# Patient Record
Sex: MALE | Race: WHITE
[De-identification: names, ages, dates, MRNs, and addresses within clinical notes are randomized per-mention and may not be internally consistent; named-entity substitution may affect disease eponyms.]

---

## 2017-10-22 NOTE — EDM.PDOC
ED HPI GENERAL MEDICAL PROBLEM





- General


Chief Complaint: Abdominal Pain


Stated Complaint: PT HAS STOMACH PAINS


Time Seen by Provider: 10/22/17 19:07


Source of Information: Reports: Patient


History Limitations: Reports: No Limitations





- History of Present Illness


INITIAL COMMENTS - FREE TEXT/NARRATIVE: 


History of present illness:


[84-year-old male presents with complaints of upper abdominal pain. Indicates 

that it started sometime this morning and has gotten progressively worse. 

Patient indicates that he is concerned that it might be something seriously 

would like to be evaluated.]





Review of systems: 


As per history of present illness and below otherwise all systems reviewed and 

negative.





Past medical history: 


As per history of present illness and as reviewed below otherwise 

noncontributory.





Surgical history: 


As per history of present illness and as reviewed below otherwise 

noncontributory.





Social history: 


No reported history of drug or alcohol abuse.





Family history: 


As per history of present illness and as reviewed below otherwise 

noncontributory.





Physical exam:


HEENT: Atraumatic, normocephalic, pupils reactive, negative for conjunctival 

pallor or scleral icterus, mucous membranes moist, throat clear, neck supple, 

nontender, trachea midline.


Lungs: Clear to auscultation, breath sounds equal bilaterally, chest nontender.


Heart: S1S2, regular, negative for clicks, rubs, or JVD.


Abdomen: Soft, nondistended, nontender. Negative for masses or 

hepatosplenomegaly. Negative for costovertebral tenderness.


Pelvis: Stable nontender.


Genitourinary: Deferred.


Rectal: Deferred.


Extremities: Atraumatic, negative for cords or calf pain. Neurovascular 

unremarkable.


Neuro: Awake, alert, oriented. Cranial nerves II through XII unremarkable. 

Cerebellum unremarkable. Motor and sensory unremarkable throughout. Exam 

nonfocal.





Diagnostics:


[CBC, CMP, amylase, lipase, troponin, EKG]





Therapeutics:


[Saline lock]





Impression: 


[#1 abdominal pain


#2 renal calculi]





Plan:


[Flomax, pain medicine]





Definitive disposition and diagnosis as appropriate pending reevaluation and 

review of above.








  ** abdominal area


Pain Score (Numeric/FACES): 5





- Related Data


 Allergies











Allergy/AdvReac Type Severity Reaction Status Date / Time


 


No Known Allergies Allergy   Verified 10/22/17 18:45











Home Meds: 


 Home Meds





Allopurinol [Zyloprim] 100 mg PO ONETIME 10/22/17 [History]


Furosemide 40 mg PO DAILY 10/22/17 [History]


Tamsulosin [Flomax] 0.4 mg PO BIDPC #20 cap.er 10/22/17 [Rx]


Terazosin [Hytrin] 1 mg PO DAILY 10/22/17 [History]


atorvaSTATin [Lipitor] 20 mg PO ONETIME 10/22/17 [History]


metFORMIN [Glucophage XR] 500 mg PO BIDMEALS 10/22/17 [History]











Past Medical History


HEENT History: Reports: Hard of Hearing, Other (See Below)


Other HEENT History: bilateral hearing aid.  false teeth


Cardiovascular History: Reports: High Cholesterol


Respiratory History: Reports: None


Gastrointestinal History: Reports: Other (See Below)


Other Gastrointestinal History: Abdominal Hernia


Genitourinary History: Reports: None


Musculoskeletal History: Reports: None


Neurological History: Reports: None


Psychiatric History: Reports: None


Endocrine/Metabolic History: Reports: Diabetes, Type II


Hematologic History: Reports: None


Immunologic History: Reports: None


Oncologic (Cancer) History: Reports: None


Dermatologic History: Reports: None





- Infectious Disease History


Infectious Disease History: Reports: None





- Past Surgical History


Head Surgeries/Procedures: Reports: None





Social & Family History





- Family History


Family Medical History: Noncontributory





- Tobacco Use


Smoking Status *Q: Never Smoker





- Caffeine Use


Caffeine Use: Reports: Soda





- Recreational Drug Use


Recreational Drug Use: No





ED ROS GENERAL





- Review of Systems


Review Of Systems: See Below (History of present illness)





ED EXAM, GENERAL





- Physical Exam


Exam: See Below (See history of present illness)





Course





- Vital Signs


Last Recorded V/S: 


 Last Vital Signs











Temp  36.6 C   10/22/17 18:45


 


Pulse  109 H  10/22/17 20:15


 


Resp  20   10/22/17 20:15


 


BP  128/70   10/22/17 20:15


 


Pulse Ox  95   10/22/17 20:15














- Orders/Labs/Meds


Orders: 


 Active Orders 24 hr











 Category Date Time Status


 


 EKG Documentation Completion [RC] STAT Care  10/22/17 18:55 Active


 


 Abdomen Pelvis wo Cont [CT] Stat Exams  10/22/17 20:44 Taken


 


 Chest 2V [CR] Stat Exams  10/22/17 18:55 Taken


 


 Sodium Chloride 0.9% [Saline Flush] Med  10/22/17 18:55 Active





 10 ml FLUSH ASDIRECTED PRN   


 


 Sodium Chloride 0.9% [Saline Flush] Med  10/22/17 18:55 Active





 2.5 ml FLUSH ASDIRECTED PRN   


 


 Saline Lock Insert [OM.PC] Stat Oth  10/22/17 18:55 Ordered








 Medication Orders





Sodium Chloride (Saline Flush)  10 ml FLUSH ASDIRECTED PRN


   PRN Reason: Keep Vein Open


   Last Admin: 10/22/17 19:17  Dose: 10 ml


Sodium Chloride (Saline Flush)  2.5 ml FLUSH ASDIRECTED PRN


   PRN Reason: Keep Vein Open


   Last Admin: 10/22/17 19:17  Dose: 2.5 ml








Labs: 


 Laboratory Tests











  10/22/17 10/22/17 Range/Units





  19:15 19:15 


 


WBC  11.60 H   (4.0-11.0)  K/uL


 


RBC  4.79   (4.50-5.90)  M/uL


 


Hgb  15.5   (13.0-17.0)  g/dL


 


Hct  46.3   (38.0-50.0)  %


 


MCV  96.7   (80.0-98.0)  fL


 


MCH  32.4 H   (27.0-32.0)  pg


 


MCHC  33.5   (31.0-37.0)  g/dL


 


RDW Std Deviation  51.4   (28.0-62.0)  fl


 


RDW Coeff of Noah  15   (11.0-15.0)  %


 


Plt Count  189   (150-400)  K/uL


 


MPV  10.40   (7.40-12.00)  fL


 


Neut % (Auto)  81.7 H   (48.0-80.0)  %


 


Lymph % (Auto)  9.6 L   (16.0-40.0)  %


 


Mono % (Auto)  8.5   (0.0-15.0)  %


 


Eos % (Auto)  0.1   (0.0-7.0)  %


 


Baso % (Auto)  0.1   (0.0-1.5)  %


 


Neut # (Auto)  9.5 H   (1.4-5.7)  K/uL


 


Lymph # (Auto)  1.1   (0.6-2.4)  K/uL


 


Mono # (Auto)  1.0 H   (0.0-0.8)  K/uL


 


Eos # (Auto)  0.0   (0.0-0.7)  K/uL


 


Baso # (Auto)  0.0   (0.0-0.1)  K/uL


 


Nucleated RBC %  0.0   /100WBC


 


Nucleated RBCs #  0   K/uL


 


Sodium   141  (136-146)  mmol/L


 


Potassium   4.4  (3.5-5.1)  mmol/L


 


Chloride   105  ()  mmol/L


 


Carbon Dioxide   25  (21-31)  mmol/L


 


BUN   14  (6.0-23.0)  mg/dL


 


Creatinine   1.7 H  (0.6-1.5)  mg/dL


 


Est Cr Clr Drug Dosing   35.50  mL/min


 


Estimated GFR (MDRD)   38.6  ml/min


 


Glucose   138 H  ()  mg/dL


 


Calcium   9.6  (8.8-10.8)  mg/dL


 


Total Bilirubin   1.2  (0.1-1.5)  mg/dL


 


AST   21  (5-40)  IU/L


 


ALT   18  (8-54)  IU/L


 


Alkaline Phosphatase   125  ()  


 


Troponin I   < 0.10  (0.0-0.29)  NG/ML


 


Total Protein   7.0  (6.0-8.0)  g/dL


 


Albumin   4.0  (3.4-4.8)  g/dL


 


Globulin   3.0  (2.0-3.5)  g/dL


 


Albumin/Globulin Ratio   1.3  (1.3-2.8)  


 


Amylase   37  (10-90)  U/L


 


Lipase   < 9  (7-80)  U/L











Meds: 


Medications











Generic Name Dose Route Start Last Admin





  Trade Name Heidi  PRN Reason Stop Dose Admin


 


Sodium Chloride  10 ml  10/22/17 18:55  10/22/17 19:17





  Saline Flush  FLUSH   10 ml





  ASDIRECTED PRN   Administration





  Keep Vein Open   


 


Sodium Chloride  2.5 ml  10/22/17 18:55  10/22/17 19:17





  Saline Flush  FLUSH   2.5 ml





  ASDIRECTED PRN   Administration





  Keep Vein Open   














Discontinued Medications














Generic Name Dose Route Start Last Admin





  Trade Name Freq  PRN Reason Stop Dose Admin


 


Ketorolac Tromethamine  30 mg  10/22/17 18:55  10/22/17 19:17





  Toradol  IVPUSH  10/22/17 18:56  30 mg





  ONETIME ONE   Administration














Departure





- Departure


Time of Disposition: 22:18


Disposition: Home, Self-Care 01


Condition: Good


Clinical Impression: 


 Renal and ureteric calculus








- Discharge Information


Referrals: 


PCP,None [Primary Care Provider] - 


Forms:  ED Department Discharge


Additional Instructions: 


The following information is given to patients seen in the emergency department 

who are being discharged to home. This information is to outline your options 

for follow-up care. We provide all patients seen in our emergency department 

with a follow-up referral.





The need for follow-up, as well as the timing and circumstances, are variable 

depending upon the specifics of your emergency department visit.





If you don't have a primary care physician on staff, we will provide you with a 

referral. We always advise you to contact your personal physician following an 

emergency department visit to inform them of the circumstance of the visit and 

for follow-up with them and/or the need for any referrals to a consulting 

specialist.





The emergency department will also refer you to a specialist when appropriate. 

This referral assures that you have the opportunity for follow-up care with a 

specialist. All of these measure are taken in an effort to provide you with 

optimal care, which includes your follow-up.





Under all circumstances we always encourage you to contact your private 

physician who remains a resource for coordinating your care. When calling for 

follow-up care, please make the office aware that this follow-up is from your 

recent emergency room visit. If for any reason you are refused follow-up, 

please contact the [facility] Emergency 

Department at [phone] and asked to speak to the emergency department 

charge nurse.





Take medication as directed





Follow-up with primary care provider one to 2 days








Follow up with urology





[facility]


Specialty Care - Urology


[address] [place] [id number]


Phone: [phone]


Fax: [phone]











- My Orders


Last 24 Hours: 


My Active Orders





10/22/17 18:55


EKG Documentation Completion [RC] STAT 


Chest 2V [CR] Stat 


Sodium Chloride 0.9% [Saline Flush]   10 ml FLUSH ASDIRECTED PRN 


Sodium Chloride 0.9% [Saline Flush]   2.5 ml FLUSH ASDIRECTED PRN 


Saline Lock Insert [OM.PC] Stat 





10/22/17 20:44


Abdomen Pelvis wo Cont [CT] Stat 














- Assessment/Plan


Last 24 Hours: 


My Active Orders





10/22/17 18:55


EKG Documentation Completion [RC] STAT 


Chest 2V [CR] Stat 


Sodium Chloride 0.9% [Saline Flush]   10 ml FLUSH ASDIRECTED PRN 


Sodium Chloride 0.9% [Saline Flush]   2.5 ml FLUSH ASDIRECTED PRN 


Saline Lock Insert [OM.PC] Stat 





10/22/17 20:44


Abdomen Pelvis wo Cont [CT] Stat

## 2017-10-23 NOTE — CR
EXAM DATE: 10/22/17



PATIENT'S AGE: 84



Patient: MAGAN DELCID



Facility: [place], [place]

Patient ID: [id number]

Site Patient ID: [id number].

Site Accession #: [id number]

: 1933

Study: XRay Chest [id number]/22/2017 7:44:41 PM

Ordering Physician: Doctor Temp



Final Report: 

INDICATION: Abdominal pain



TECHNIQUE: Chest radiograph 2 views 



COMPARISON: None 



FINDINGS: 

Moderate degradation of image quality noted due to body habitus. 



Cardiovascular and mediastinum: The cardiac silhouette is normal in appearance 
and size. Mediastinum is within normal limits. 



Lungs and pleural spaces: Both lungs are unremarkable in appearance. No sign of 
pleural effusion. No pneumothorax is seen. 



Bones and soft tissues: No significant findings. 



IMPRESSION: 

1. No acute cardiopulmonary disease seen. 





Dictated by: Leonel Nunez MD @ 10/22/2017 20:14:22

(Electronic Signature)





Report Signed by Proxy.
TOÑA

## 2017-10-23 NOTE — CT
EXAM DATE: 10/22/17



PATIENT'S AGE: 84



Patient: MAGAN DELCID



Facility: [place], [place]

Patient ID: [id number]

Site Patient ID: [id number].

Site Accession #: [id number]

: 1933

Study: CT Abdomen/Pelvis [id number]/22/2017 9:15:42 PM

Ordering Physician: Doctor Temp



Final Report: 

INDICATION: Abdominal pain in the left lower quadrant



TECHNIQUE: CT Abdomen and pelvis without i.v. contrast. Coronal and sagittal 
reformats were obtained.



CONTRAST: None



COMPARISON: 2009



FINDINGS: 



Lower chest: Unremarkable. 



Liver: Unremarkable. 



Spleen: Unremarkable. 



Pancreas: Unremarkable. 



Gallbladder: The gallbladder is moderately distended measuring 5 cm without 
evidence of wall thickening or surrounding inflammatory changes. 



Kidney: There is a 1 mm stone present in the distal left ureter that causes 
moderate left hydroureter and moderate left renal pelvicaliectasis. There is a 
2 mm stone present near the upper pole of the left kidney. Mild left 
perinephric edema is noted. A 1 mm stone is present in the mid zone of the 
right kidney. 



Adrenal: Unremarkable. 



GI tract: Moderate amount of stool is present throughout the colon which may be 
due to chronic constipation. The appendix measures 1 cm in length which may be 
due to prior appendectomy with residual appendiceal stump. A moderate-to-large 
fat containing umbilical hernia is noted measuring 6 cm. 



Vascular: Unremarkable. 



Lymph: Unremarkable. 



Peritoneum: Unremarkable. No pneumoperitoneum is seen. No significant ascites 
is noted. 



Pelvis: There are over 10 punctate stones present within the bladder measuring 
up to 5 mm. Mild enlargement of the prostate gland is noted. 



Soft tissue: Unremarkable. 



Bones: There is a stable lucent lesion in the L3 vertebral body noted measuring 
1.6 cm. 



IMPRESSIONS: 

1. There is a 1 mm stone present in the distal left ureter that causes moderate 
left hydroureter and moderate left renal pelvicaliectasis.

2. There are over 10 punctate stones present within the bladder measuring up to 
5 mm.



Dictated by Leonel Nunez MD @ 10/22/2017 10:08:47 PM



Dictated by: Leonel Nunez MD @ 10/22/2017 22:08:55

(Electronic Signature)



Report Signed by Proxy.
TOÑA

## 2021-12-24 ENCOUNTER — HOSPITAL ENCOUNTER (INPATIENT)
Dept: HOSPITAL 56 - MW.ED | Age: 86
LOS: 7 days | Discharge: HOME HEALTH | DRG: 56 | End: 2021-12-31
Attending: FAMILY MEDICINE | Admitting: FAMILY MEDICINE
Payer: MEDICARE

## 2021-12-24 DIAGNOSIS — R29.700: ICD-10-CM

## 2021-12-24 DIAGNOSIS — U09.9: ICD-10-CM

## 2021-12-24 DIAGNOSIS — I44.4: ICD-10-CM

## 2021-12-24 DIAGNOSIS — Z79.899: ICD-10-CM

## 2021-12-24 DIAGNOSIS — R26.2: ICD-10-CM

## 2021-12-24 DIAGNOSIS — E11.9: ICD-10-CM

## 2021-12-24 DIAGNOSIS — E78.00: ICD-10-CM

## 2021-12-24 DIAGNOSIS — I69.993: Primary | ICD-10-CM

## 2021-12-24 DIAGNOSIS — H91.93: ICD-10-CM

## 2021-12-24 DIAGNOSIS — I10: ICD-10-CM

## 2021-12-24 DIAGNOSIS — Z79.84: ICD-10-CM

## 2021-12-24 DIAGNOSIS — H91.90: ICD-10-CM

## 2021-12-24 DIAGNOSIS — U07.1: ICD-10-CM

## 2021-12-24 DIAGNOSIS — Z87.891: ICD-10-CM

## 2021-12-24 DIAGNOSIS — E78.5: ICD-10-CM

## 2021-12-24 LAB
BUN SERPL-MCNC: 14 MG/DL (ref 7–18)
CHLORIDE SERPL-SCNC: 102 MMOL/L (ref 98–107)
CO2 SERPL-SCNC: 26.5 MMOL/L (ref 21–32)
FLUAV RNA UPPER RESP QL NAA+PROBE: NEGATIVE
FLUBV RNA UPPER RESP QL NAA+PROBE: NEGATIVE
GLUCOSE SERPL-MCNC: 145 MG/DL (ref 74–106)
POTASSIUM SERPL-SCNC: 3.9 MMOL/L (ref 3.5–5.1)
SARS-COV-2 RNA RESP QL NAA+PROBE: POSITIVE
SODIUM SERPL-SCNC: 142 MMOL/L (ref 136–148)

## 2021-12-24 PROCEDURE — A9577 INJ MULTIHANCE: HCPCS

## 2021-12-24 NOTE — CT
INDICATION:



Acute stroke, ataxia.



TECHNIQUE:



CTA head with contrast bolus tracking and 3D MIP reconstruction.



FINDINGS:



There is minor intracranial atherosclerotic disease. There is no 

significant intracranial stenosis. There is no large vessel occlusion. No 

aneurysm is identified.



IMPRESSION:



Unremarkable head CTA.



Please note that all CT scans at this facility use dose modulation, 

iterative reconstruction, and/or weight-based dosing when appropriate to 

reduce radiation dose to as low as reasonably achievable.



Dictated by Fernando Zhu MD @ 12/24/2021 8:23:02 PM



(Electronically Signed)

## 2021-12-24 NOTE — PCM.HP.2
H&P History of Present Illness





- General


Date of Service: 12/24/21


Admit Problem/Dx: 


                           Admission Diagnosis/Problem





Admission Diagnosis/Problem      Ambulatory dysfunction








Source of Information: Patient


History Limitations: Reports: Other (Hard of hearing, but lucid)





- History of Present Illness


Initial Comments - Free Text/Narative: 





Patient had been in his usual state of health until about today when he noticed 

that he was unable to walk normally.  He was unable to climb into the passenger 

side of knee cab of a wrecker truck where he rides with a friend.  Knee is never

to his knowledge had a stroke or physical impairment of the sort.  He has 

diabetes, hypertension, but denies any history of cardiovascular, 

cerebrovascular, or pulmonary disease.  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  He denies significant past illnesses or surgeries.  In the 

emergency room he was observed to have a sort of circumduction or shuffling 

gait.  He had not fallen.  He seemed to have weakness in the right leg in Dr. Garcia's exam, but said that his left leg felt weaker my exam.  Arms are 

normal.  Fasces were normal.  Results of CTs and other studies showed no 

evidence of acute cerebral infarction or hemorrhage, or ventricular abnormality,

or mass-effect.  Review of systems otherwise negative.





- Related Data


Allergies/Adverse Reactions: 


                                    Allergies











Allergy/AdvReac Type Severity Reaction Status Date / Time


 


No Known Allergies Allergy   Verified 12/24/21 22:05











Home Medications: 


                                    Home Meds





Allopurinol [Zyloprim] 100 mg PO ONETIME 10/22/17 [History]


Furosemide 40 mg PO DAILY 10/22/17 [History]


atorvaSTATin [Lipitor] 20 mg PO BEDTIME 10/22/17 [History]


metFORMIN [Glucophage XR] 500 mg PO DAILY 10/22/17 [History]











Past Medical History


HEENT History: Reports: Hard of Hearing, Other (See Below)


Other HEENT History: bilateral hearing aids.  false teeth


Cardiovascular History: Reports: High Cholesterol


Respiratory History: Reports: None


Gastrointestinal History: Reports: Other (See Below)


Other Gastrointestinal History: Abdominal Hernia


Genitourinary History: Reports: None


Musculoskeletal History: Reports: None


Neurological History: Reports: None


Psychiatric History: Reports: None


Endocrine/Metabolic History: Reports: Diabetes, Type II


Hematologic History: Reports: None


Immunologic History: Reports: None


Oncologic (Cancer) History: Reports: None


Dermatologic History: Reports: None





- Infectious Disease History


Infectious Disease History: Reports: None





- Past Surgical History


Head Surgeries/Procedures: Reports: None





Social & Family History





- Family History


Family Medical History: No Pertinent Family History





- Tobacco Use


Tobacco Use Status *Q: Former Tobacco User


Used Tobacco, but Quit: Yes


Month/Year Tobacco Last Used: 20 years





- Caffeine Use


Caffeine Use: Reports: Soda





- Recreational Drug Use


Recreational Drug Use: No





- Living Situation & Occupation


Living situation: Reports: , Alone


Occupation: Retired





H&P Review of Systems





- Review of Systems:


Review Of Systems: See Below


General: Reports: No Symptoms


HEENT: Reports: Hearing Changes


Pulmonary: Reports: No Symptoms


Cardiovascular: Reports: No Symptoms


Gastrointestinal: Reports: No Symptoms


Genitourinary: Reports: No Symptoms


Musculoskeletal: Reports: No Symptoms


Skin: Reports: No Symptoms


Psychiatric: Reports: No Symptoms


Neurological: Reports: No Symptoms


Hematologic/Lymphatic: Reports: No Symptoms


Immunologic: Reports: No Symptoms





Exam





- Exam


Exam: See Below





- Vital Signs


Vital Signs: 


                                Last Vital Signs











Temp  98.1 F   12/24/21 23:49


 


Pulse  110 H  12/24/21 23:49


 


Resp  22 H  12/24/21 23:49


 


BP  100/58 L  12/24/21 23:49


 


Pulse Ox  97   12/24/21 23:49











Weight: 202 lb 8 oz





- Exam


Quality Assessment: Supplemental Oxygen


General: Alert, Oriented, Cooperative


HEENT: Conjunctiva Clear, EOMI


Neck: Supple, Trachea Midline


Lungs: Clear to Auscultation, Normal Respiratory Effort


Cardiovascular: Regular Rate.  No: Gallop/S3, Gallop/S4


GI/Abdominal Exam: Normal Bowel Sounds, Non-Tender


 (Male) Exam: Deferred


Rectal (Males) Exam: Deferred


Back Exam: Normal Inspection


Extremities: Normal Inspection, Non-Tender, No Pedal Edema


Skin: Other (Senile keratoses.)


Neurological: Cranial Nerves Intact, Sensation Intact


Neuro Extensive - Mental Status: Alert, Oriented x3, Normal Mood/Affect, Normal 

Cognition.  No: Memory Loss-Recent Events


Neuro Extensive - Motor, Sensory, Reflexes: Abnormal Gait (Can stand next to 

wheelchair and transfer himself from bed to wheelchair. Did not further test 

gait due to leg weakness and avoidance of falling.), Ataxia, Abnormal Finger to 

Nose.  No: Normal Gait, Tongue Deviation (L), Dysarthria, Receptive Aphasia, 

Expressive Aphasia, Facial palsy (L), Facial Palsy (R), Pronator Drift (L), 

Abnormal Romberg, Tremor


Psychiatric: Alert, Normal Affect, Normal Mood





- Patient Data


Lab Results Last 24 hrs: 


                         Laboratory Results - last 24 hr











  12/24/21 12/24/21 12/24/21 Range/Units





  18:30 18:30 18:30 


 


WBC  7.32    (4.0-11.0)  K/uL


 


RBC  4.68    (4.50-5.90)  M/uL


 


Hgb  15.3    (13.0-17.0)  g/dL


 


Hct  45.9    (38.0-50.0)  %


 


MCV  98.1 H    (80.0-98.0)  fL


 


MCH  32.7 H    (27.0-32.0)  pg


 


MCHC  33.3    (31.0-37.0)  g/dL


 


RDW Std Deviation  49.8    (28.0-62.0)  fl


 


RDW Coeff of Noah  14    (11.0-15.0)  %


 


Plt Count  195    (150-400)  K/uL


 


MPV  11.00    (7.40-12.00)  fL


 


Neut % (Auto)  77.8    (48.0-80.0)  %


 


Lymph % (Auto)  8.1 L    (16.0-40.0)  %


 


Mono % (Auto)  13.3    (0.0-15.0)  %


 


Eos % (Auto)  0.7    (0.0-7.0)  %


 


Baso % (Auto)  0.1    (0.0-1.5)  %


 


Neut # (Auto)  5.7    (1.4-5.7)  K/uL


 


Lymph # (Auto)  0.6    (0.6-2.4)  K/uL


 


Mono # (Auto)  1.0 H    (0.0-0.8)  K/uL


 


Eos # (Auto)  0.1    (0.0-0.7)  K/uL


 


Baso # (Auto)  0.0    (0.0-0.1)  K/uL


 


Nucleated RBC %  0.0    /100WBC


 


Nucleated RBCs #  0    K/uL


 


ESR     (0-19)  mm/hr


 


INR   1.00   


 


APTT   24.5   (18.6-31.3)  SEC


 


Sodium    142  (136-148)  mmol/L


 


Potassium    3.9  (3.5-5.1)  mmol/L


 


Chloride    102  ()  mmol/L


 


Carbon Dioxide    26.5  (21.0-32.0)  mmol/L


 


BUN    14  (7.0-18.0)  mg/dL


 


Creatinine    1.7 H  (0.8-1.3)  mg/dL


 


Est Cr Clr Drug Dosing    32.97  mL/min


 


Estimated GFR (MDRD)    38.2  ml/min


 


Glucose    145 H  ()  mg/dL


 


POC Glucose     (70-99)  mg/dL


 


Calcium    8.5  (8.5-10.1)  mg/dL


 


Total Bilirubin    1.0  (0.2-1.0)  mg/dL


 


AST    23  (15-37)  IU/L


 


ALT    31  (14-63)  IU/L


 


Alkaline Phosphatase    145 H  ()  U/L


 


Creatine Kinase     ()  U/L


 


Troponin I    < 0.050  (0.000-0.056)  ng/mL


 


C-Reactive Protein     (0.00-0.90)  mg/dL


 


Total Protein    7.1  (6.4-8.2)  g/dL


 


Albumin    3.7  (3.4-5.0)  g/dL


 


Globulin    3.4  (2.6-4.0)  g/dL


 


Albumin/Globulin Ratio    1.1  (0.9-1.6)  


 


Influenza Type A RNA     (NEGATIVE)  


 


Influenza Type B RNA     (NEGATIVE)  


 


SARS-CoV-2 RNA ([facility])     (NEGATIVE)  














  12/24/21 12/24/21 12/24/21 Range/Units





  18:30 18:30 18:30 


 


WBC     (4.0-11.0)  K/uL


 


RBC     (4.50-5.90)  M/uL


 


Hgb     (13.0-17.0)  g/dL


 


Hct     (38.0-50.0)  %


 


MCV     (80.0-98.0)  fL


 


MCH     (27.0-32.0)  pg


 


MCHC     (31.0-37.0)  g/dL


 


RDW Std Deviation     (28.0-62.0)  fl


 


RDW Coeff of Noah     (11.0-15.0)  %


 


Plt Count     (150-400)  K/uL


 


MPV     (7.40-12.00)  fL


 


Neut % (Auto)     (48.0-80.0)  %


 


Lymph % (Auto)     (16.0-40.0)  %


 


Mono % (Auto)     (0.0-15.0)  %


 


Eos % (Auto)     (0.0-7.0)  %


 


Baso % (Auto)     (0.0-1.5)  %


 


Neut # (Auto)     (1.4-5.7)  K/uL


 


Lymph # (Auto)     (0.6-2.4)  K/uL


 


Mono # (Auto)     (0.0-0.8)  K/uL


 


Eos # (Auto)     (0.0-0.7)  K/uL


 


Baso # (Auto)     (0.0-0.1)  K/uL


 


Nucleated RBC %     /100WBC


 


Nucleated RBCs #     K/uL


 


ESR    11  (0-19)  mm/hr


 


INR     


 


APTT     (18.6-31.3)  SEC


 


Sodium     (136-148)  mmol/L


 


Potassium     (3.5-5.1)  mmol/L


 


Chloride     ()  mmol/L


 


Carbon Dioxide     (21.0-32.0)  mmol/L


 


BUN     (7.0-18.0)  mg/dL


 


Creatinine     (0.8-1.3)  mg/dL


 


Est Cr Clr Drug Dosing     mL/min


 


Estimated GFR (MDRD)     ml/min


 


Glucose     ()  mg/dL


 


POC Glucose     (70-99)  mg/dL


 


Calcium     (8.5-10.1)  mg/dL


 


Total Bilirubin     (0.2-1.0)  mg/dL


 


AST     (15-37)  IU/L


 


ALT     (14-63)  IU/L


 


Alkaline Phosphatase     ()  U/L


 


Creatine Kinase   65   ()  U/L


 


Troponin I     (0.000-0.056)  ng/mL


 


C-Reactive Protein     (0.00-0.90)  mg/dL


 


Total Protein     (6.4-8.2)  g/dL


 


Albumin     (3.4-5.0)  g/dL


 


Globulin     (2.6-4.0)  g/dL


 


Albumin/Globulin Ratio     (0.9-1.6)  


 


Influenza Type A RNA  NEGATIVE    (NEGATIVE)  


 


Influenza Type B RNA  NEGATIVE    (NEGATIVE)  


 


SARS-CoV-2 RNA ([facility])  POSITIVE H    (NEGATIVE)  














  12/24/21 12/24/21 Range/Units





  18:30 18:57 


 


WBC    (4.0-11.0)  K/uL


 


RBC    (4.50-5.90)  M/uL


 


Hgb    (13.0-17.0)  g/dL


 


Hct    (38.0-50.0)  %


 


MCV    (80.0-98.0)  fL


 


MCH    (27.0-32.0)  pg


 


MCHC    (31.0-37.0)  g/dL


 


RDW Std Deviation    (28.0-62.0)  fl


 


RDW Coeff of Noah    (11.0-15.0)  %


 


Plt Count    (150-400)  K/uL


 


MPV    (7.40-12.00)  fL


 


Neut % (Auto)    (48.0-80.0)  %


 


Lymph % (Auto)    (16.0-40.0)  %


 


Mono % (Auto)    (0.0-15.0)  %


 


Eos % (Auto)    (0.0-7.0)  %


 


Baso % (Auto)    (0.0-1.5)  %


 


Neut # (Auto)    (1.4-5.7)  K/uL


 


Lymph # (Auto)    (0.6-2.4)  K/uL


 


Mono # (Auto)    (0.0-0.8)  K/uL


 


Eos # (Auto)    (0.0-0.7)  K/uL


 


Baso # (Auto)    (0.0-0.1)  K/uL


 


Nucleated RBC %    /100WBC


 


Nucleated RBCs #    K/uL


 


ESR    (0-19)  mm/hr


 


INR    


 


APTT    (18.6-31.3)  SEC


 


Sodium    (136-148)  mmol/L


 


Potassium    (3.5-5.1)  mmol/L


 


Chloride    ()  mmol/L


 


Carbon Dioxide    (21.0-32.0)  mmol/L


 


BUN    (7.0-18.0)  mg/dL


 


Creatinine    (0.8-1.3)  mg/dL


 


Est Cr Clr Drug Dosing    mL/min


 


Estimated GFR (MDRD)    ml/min


 


Glucose    ()  mg/dL


 


POC Glucose   138 H  (70-99)  mg/dL


 


Calcium    (8.5-10.1)  mg/dL


 


Total Bilirubin    (0.2-1.0)  mg/dL


 


AST    (15-37)  IU/L


 


ALT    (14-63)  IU/L


 


Alkaline Phosphatase    ()  U/L


 


Creatine Kinase    ()  U/L


 


Troponin I    (0.000-0.056)  ng/mL


 


C-Reactive Protein  0.40   (0.00-0.90)  mg/dL


 


Total Protein    (6.4-8.2)  g/dL


 


Albumin    (3.4-5.0)  g/dL


 


Globulin    (2.6-4.0)  g/dL


 


Albumin/Globulin Ratio    (0.9-1.6)  


 


Influenza Type A RNA    (NEGATIVE)  


 


Influenza Type B RNA    (NEGATIVE)  


 


SARS-CoV-2 RNA ([facility])    (NEGATIVE)  











Result Diagrams: 


                                 12/27/21 05:57





                                 12/27/21 05:57





Sepsis Event Note





- Evaluation


Sepsis Screening Result: No Definite Risk





- Focused Exam


Vital Signs: 


                                   Vital Signs











  Temp Pulse Resp BP Pulse Ox


 


 12/24/21 23:49  98.1 F  110 H  22 H  100/58 L  97


 


 12/24/21 22:04  98.1 F  111 H  20  121/65  94 L


 


 12/24/21 21:00  97.5 F  116 H  22 H  117/68  94 L


 


 12/24/21 20:39   87  20  123/76  99


 


 12/24/21 19:48  99.5 F  91  20  128/83  95


 


 12/24/21 19:33  99.5 F  90  20  130/80  96


 


 12/24/21 19:18  99.7 F  87  20  128/80  95


 


 12/24/21 19:03  99.8 F  123 H  18  125/72  95














- Problem List


(1) Ataxia following unspecified cerebrovascular disease


SNOMED Code(s): 99105977466105


   ICD Code: I69.993 - ATAXIA FOLLOWING UNSPECIFIED CEREBROVASCULAR DISEASE   

Status: Acute   Priority: High   Current Visit: Yes   Onset Date: ~12/24/21   

Problem Details: Maintain consistent care for fall risk.  Observe patient.  MRA 

studies of both head and neck are planned.  PT/OT/ST consults plan.   





(2) COVID-19 determined by clinical diagnostic criteria


SNOMED Code(s): 201525833, [id number]


   ICD Code: U07.1 - COVID-19   Status: Acute   Priority: High   Current Visit: 

Yes   Problem Details: No evidence of fever, infection, respiratory difficulty. 

 Concern might be for a sequela of vasculitis affecting cerebrovascular 

function.  We will obtain an MRI/MRA Of carotid, vertebral and cerebral arteries

 and brain. ED attending had consulted with a neurologist, Dr. Hook, in [place], 

325 mg of aspirin had been administered, since CT revealed no evidence of 

hemorrhage.  For now will maintain patient on a baby aspirin a day pending 

further information from neurology.   


Problem List Initiated/Reviewed/Updated: Yes


Orders Last 24hrs: 


                               Active Orders 24 hr











 Category Date Time Status


 


 Admission Status [Patient Status] [ADT] Stat ADT  12/24/21 20:39 Active


 


 Assess Neurological Status [RC] ASDIRECTED Care  12/24/21 18:54 Active


 


 Bedrest [RC] ASDIRECTED Care  12/24/21 18:54 Active


 


 Blood Glucose Check, Bedside [RC] ONETIME Care  12/24/21 18:53 Active


 


 Cardiac Monitoring [RC] .AS DIRECTED Care  12/24/21 18:54 Active


 


 Height and Weight [RC] UPON Care  12/24/21 18:54 Active


 


 Initiate Acute Stroke Protocol [RC] STAT Care  12/24/21 18:54 Active


 


 NIH Stroke Scale [RC] ASDIRECTED Care  12/24/21 18:54 Active


 


 Telemetry Monitoring [Cardiac Monitoring] [RC] .AS Care  12/24/21 20:58 Active





 DIRECTED   


 


 Vital Signs [RC] Q4H Care  12/24/21 18:54 Active


 


 Brain w wo Cont [MR] Stat Exams  12/24/21 20:32 Ordered


 


 Sodium Chloride 0.9% [Normal Saline] Med  12/24/21 18:53 Active





 10 ml IV ASDIRECTED PRN   


 


 Sodium Chloride 0.9% [Saline Flush] Med  12/24/21 18:53 Active





 10 ml FLUSH ASDIRECTED PRN   


 


 Sodium Chloride 0.9% [Saline Flush] Med  12/24/21 18:53 Active





 2.5 ml FLUSH ASDIRECTED PRN   


 


 Peripheral IV Insertion Adult [OM.PC] Stat Oth  12/24/21 18:54 Ordered


 


 Peripheral IV Insertion Adult [OM.PC] Stat Oth  12/24/21 18:54 Ordered








                                Medication Orders





Sodium Chloride (Sodium Chloride 0.9% 10 Ml Syringe)  10 ml FLUSH ASDIRECTED PRN


   PRN Reason: Keep Vein Open


   Last Admin: 12/24/21 19:34  Dose: 10 ml


   Documented by: AMERICA


Sodium Chloride (Sodium Chloride 0.9% 2.5 Ml Syringe)  2.5 ml FLUSH ASDIRECTED 

PRN


   PRN Reason: Keep Vein Open


   Last Admin: 12/24/21 19:34  Dose: 2.5 ml


   Documented by: AMERICA


Sodium Chloride (Sodium Chloride 0.9% 20 Ml Sdv)  10 ml IV ASDIRECTED PRN


   PRN Reason: IV Use











- Mortality Measure


Prognosis:: Good

## 2021-12-24 NOTE — CT
INDICATION:



Difficulty walking, ataxia.



TECHNIQUE:



CT head without contrast.



COMPARISON:



None. 



FINDINGS:



Cerebral parenchyma: No evidence of acute territorial infarct. No acute 

intraparenchymal hemorrhage. No significant mass effect/midline shift. 

Normal gray-white matter differentiation. 



Extra-axial spaces: No extra-axial collection or hemorrhage.



Ventricles: Unremarkable.



Calvarium: Intact.



Visualized paranasal sinuses/mastoid air cells: Hypopneumatization of the 

left mastoid air cells. Mild mucosal thickening of the bilateral maxillary 

sinuses.



Posterior fossa: No cerebellar tonsillar herniation.



Visualized orbits: No acute abnormality.



IMPRESSION:



No acute intracranial abnormality.



Please note that all CT scans at this facility use dose modulation, 

iterative reconstruction, and/or weight-based dosing when appropriate to 

reduce radiation dose to as low as reasonably achievable.



Dictated by Everardo Stack MD @ 12/24/2021 8:01:07 PM



(Electronically Signed)

## 2021-12-24 NOTE — CT
INDICATION:



Acute stroke, ataxia.



TECHNIQUE:



CTA neck with contrast bolus tracking and 3D MIP reconstruction.



FINDINGS:



There is minor plaque in the ICAs. There is no carotid or vertebral artery 

stenosis or dissection. The soft tissues of the neck are within normal 

limits. Degenerative changes are incidentally noted in the cervical spine.



IMPRESSION:



No carotid or vertebral artery stenosis or dissection.



Please note that all CT scans at this facility use dose modulation, 

iterative reconstruction, and/or weight-based dosing when appropriate to 

reduce radiation dose to as low as reasonably achievable.



Dictated by Fernando Zhu MD @ 12/24/2021 8:24:54 PM



(Electronically Signed)

## 2021-12-24 NOTE — PCM.EKG
** #1 Interpretation


EKG Date: 12/24/21


Time: 18:33


Rhythm: NSR


Rate (Beats/Min): 119


Axis: Normal


P-Wave: Present


QRS: Normal


ST-T: Normal


QT: Normal


NC/PQ Interval: 161


EKG Interpretation Comments: 





left anterior fascicular block otherwise unremarkable

## 2021-12-24 NOTE — CR
INDICATION:



 Ataxia 



TECHNIQUE:



Single view chest.



FINDINGS:



The lungs are clear. The heart, mediastinum and pulmonary vessels are of 

normal size. There is no evidence of pleural disease. Low lung volume. 



IMPRESSION:



Negative chest.



Dictated by Shawna Medel MD @ 12/24/2021 7:57:50 PM



(Electronically Signed)

## 2021-12-25 LAB
BUN SERPL-MCNC: 17 MG/DL (ref 7–18)
CHLORIDE SERPL-SCNC: 105 MMOL/L (ref 98–107)
CO2 SERPL-SCNC: 25.1 MMOL/L (ref 21–32)
GLUCOSE SERPL-MCNC: 124 MG/DL (ref 74–106)
POTASSIUM SERPL-SCNC: 3.6 MMOL/L (ref 3.5–5.1)
SODIUM SERPL-SCNC: 142 MMOL/L (ref 136–148)

## 2021-12-26 LAB
BUN SERPL-MCNC: 17 MG/DL (ref 7–18)
CHLORIDE SERPL-SCNC: 105 MMOL/L (ref 98–107)
CO2 SERPL-SCNC: 23.7 MMOL/L (ref 21–32)
GLUCOSE SERPL-MCNC: 108 MG/DL (ref 74–106)
POTASSIUM SERPL-SCNC: 3.7 MMOL/L (ref 3.5–5.1)
SODIUM SERPL-SCNC: 141 MMOL/L (ref 136–148)

## 2021-12-26 NOTE — PCM.PN
- General Info


Date of Service: 12/26/21


Admission Dx/Problem (Free Text): 


                           Admission Diagnosis/Problem





Admission Diagnosis/Problem      Ambulatory dysfunction








Subjective Update: 


12/26/21


 patient   cont.  to  feel  weak,  denies  vertigo


 vss. spech  clear  and  ox4.


  no  focal  weakness /  no  arm  drift/  minimal  tremor   both  hands .


  no  lower  extremity  weakness. 


  cannot  stand  without   assist  of  2 .  shuffling    gait.


   lab   stable 





  assess:  sudden  onset  of  weakness,  etiol.  unknown .  


no  definitive  cva but  


   hx  of  previous  covid     in  recent  past .   mr and   vasculitis  

concerns. \


   hx  of  dm 


   advanced  age and  risk   factors  for   post  covid  complications.  


   dm  stable .boh 








 











Functional Status: Reports: Pain Controlled





- Review of Systems


General: Reports: No Symptoms


HEENT: Reports: No Symptoms


Pulmonary: Reports: No Symptoms


Cardiovascular: Reports: No Symptoms


Gastrointestinal: Reports: No Symptoms


Genitourinary: Reports: No Symptoms


Musculoskeletal: Reports: No Symptoms


Skin: Reports: No Symptoms


Neurological: Reports: No Symptoms


Psychiatric: Reports: No Symptoms





- Patient Data


Vitals - Most Recent: 


                                Last Vital Signs











Temp  36.4 C   12/26/21 11:00


 


Pulse  84   12/26/21 11:00


 


Resp  19   12/26/21 11:00


 


BP  117/67   12/26/21 11:00


 


Pulse Ox  97   12/26/21 11:00











Weight - Most Recent: 91.852 kg


I&O - Last 24 Hours: 


                                 Intake & Output











 12/25/21 12/26/21 12/26/21





 23:59 07:59 15:59


 


Intake Total 2107 300 


 


Output Total 490 350 


 


Balance 1617 -50 











Lab Results Last 24 Hours: 


                         Laboratory Results - last 24 hr











  12/25/21 12/26/21 12/26/21 Range/Units





  17:19 05:39 06:26 


 


Sodium   141   (136-148)  mmol/L


 


Potassium   3.7   (3.5-5.1)  mmol/L


 


Chloride   105   ()  mmol/L


 


Carbon Dioxide   23.7   (21.0-32.0)  mmol/L


 


BUN   17   (7.0-18.0)  mg/dL


 


Creatinine   1.3   (0.8-1.3)  mg/dL


 


Est Cr Clr Drug Dosing   43.11   mL/min


 


Estimated GFR (MDRD)   52.1   ml/min


 


Glucose   108 H   ()  mg/dL


 


POC Glucose  90   105 H  (70-99)  mg/dL


 


Calcium   8.1 L   (8.5-10.1)  mg/dL


 


Total Bilirubin   0.8   (0.2-1.0)  mg/dL


 


AST   29   (15-37)  IU/L


 


ALT   28   (14-63)  IU/L


 


Alkaline Phosphatase   119 H   ()  U/L


 


Total Protein   6.2 L   (6.4-8.2)  g/dL


 


Albumin   3.2 L   (3.4-5.0)  g/dL


 


Globulin   3.0   (2.6-4.0)  g/dL


 


Albumin/Globulin Ratio   1.1   (0.9-1.6)  














  12/26/21 Range/Units





  12:27 


 


Sodium   (136-148)  mmol/L


 


Potassium   (3.5-5.1)  mmol/L


 


Chloride   ()  mmol/L


 


Carbon Dioxide   (21.0-32.0)  mmol/L


 


BUN   (7.0-18.0)  mg/dL


 


Creatinine   (0.8-1.3)  mg/dL


 


Est Cr Clr Drug Dosing   mL/min


 


Estimated GFR (MDRD)   ml/min


 


Glucose   ()  mg/dL


 


POC Glucose  117 H  (70-99)  mg/dL


 


Calcium   (8.5-10.1)  mg/dL


 


Total Bilirubin   (0.2-1.0)  mg/dL


 


AST   (15-37)  IU/L


 


ALT   (14-63)  IU/L


 


Alkaline Phosphatase   ()  U/L


 


Total Protein   (6.4-8.2)  g/dL


 


Albumin   (3.4-5.0)  g/dL


 


Globulin   (2.6-4.0)  g/dL


 


Albumin/Globulin Ratio   (0.9-1.6)  











Med Orders - Current: 


                               Current Medications





Allopurinol (Allopurinol 100 Mg Tab)  100 mg PO DAILY [facility]


   Last Admin: 12/26/21 09:13 Dose:  100 mg


   Documented by: 


Atorvastatin Calcium (Atorvastatin 20 Mg Tab)  20 mg PO BEDTIME RENEA


   Last Admin: 12/25/21 20:16 Dose:  20 mg


   Documented by: 


Dextrose/Water (50% Dextrose In Water 50 Ml Syringe)  50 ml IVPUSH ASDIRECTED 

PRN


   PRN Reason: Hypoglycemia


Furosemide (Furosemide 40 Mg Tab)  40 mg PO DAILY [facility]


   Last Admin: 12/26/21 09:13 Dose:  40 mg


   Documented by: 


Glucagon (Glucagon,Human Recombinant 1 Mg Vial)  1 mg IM ASDIRECTED PRN


   PRN Reason: Hypoglycemia


Insulin Aspart (Insulin Aspart 100 Units/Ml 3 Ml Pen)  0 unit SUBCUT TIDAC [facility]; 

Protocol


   Last Admin: 12/26/21 12:28 Dose:  Not Given


   Documented by: 


Polyethylene Glycol (Polyethylene Glycol 3350 Powder 17 Gm Packet)  17 gm PO 

DAILY [facility]


   Last Admin: 12/26/21 09:12 Dose:  17 gm


   Documented by: 


Sodium Chloride (Sodium Chloride 0.9% 10 Ml Syringe)  10 ml FLUSH ASDIRECTED PRN


   PRN Reason: Keep Vein Open


   Last Admin: 12/24/21 19:34 Dose:  10 ml


   Documented by: 


Sodium Chloride (Sodium Chloride 0.9% 2.5 Ml Syringe)  2.5 ml FLUSH ASDIRECTED 

PRN


   PRN Reason: Keep Vein Open


   Last Admin: 12/24/21 19:34 Dose:  2.5 ml


   Documented by: 


Sodium Chloride (Sodium Chloride 0.9% 20 Ml Sdv)  10 ml IV ASDIRECTED PRN


   PRN Reason: IV Use





Discontinued Medications





Aspirin (Aspirin 81 Mg Tab.Chew)  324 mg PO ONETIME ONE


   Stop: 12/24/21 20:05


   Last Admin: 12/24/21 20:20 Dose:  324 mg


   Documented by: 


Sodium Chloride (Normal Saline)  1,000 mls @ 75 mls/hr IV ASDIRECTED [facility]


   Stop: 12/25/21 13:04


   Last Admin: 12/25/21 00:54 Dose:  75 mls/hr


   Documented by: 


Iopamidol (Iopamidol 755 Mg/Ml 500 Ml Multipack Bottle)  100 ml IVPUSH ONETIME 

ONE


   Stop: 12/24/21 19:03


   Last Admin: 12/25/21 07:04 Dose:  Not Given


   Documented by: 


Metformin HCl (Metformin 500 Mg Tab.Er)  500 mg PO BIDMEALS [facility]


   Last Admin: 12/25/21 09:42 Dose:  Not Given


   Documented by: 


Metformin HCl (Metformin 500 Mg Tab.Er)  500 mg PO BIDMEALS [facility]


   Last Admin: 12/25/21 10:39 Dose:  500 mg


   Documented by: 











- Exam


General: Alert, Oriented


HEENT: Pupils Equal, Pupils Reactive, EOMI, Mucous Membr. Moist/Pink


Neck: Supple


Lungs: Clear to Auscultation, Normal Respiratory Effort


Cardiovascular: Regular Rate, Regular Rhythm


GI/Abdominal Exam: Normal Bowel Sounds, Soft, Non-Tender, No Organomegaly, No 

Distention, No Abnormal Bruit, No Mass, Pelvis Stable


 (Male) Exam: No Hernia, Normal Inspection, Normal Prostate, Circumcised


Back Exam: Normal Inspection, Full Range of Motion


Extremities: Normal Inspection, Normal Range of Motion, Non-Tender, No Pedal 

Edema, Normal Capillary Refill


Skin: Warm, Dry, Intact


Wound/Incisions: Healing Well


Neurological: No New Focal Deficit.  No: Normal Gait (shuffling /  off  balance 

 2  person  assist)


Psy/Mental Status: Alert, Normal Affect, Normal Mood





- Patient Data


Lab Results Last 24 hrs: 


                         Laboratory Results - last 24 hr











  12/25/21 12/26/21 12/26/21 Range/Units





  17:19 05:39 06:26 


 


Sodium   141   (136-148)  mmol/L


 


Potassium   3.7   (3.5-5.1)  mmol/L


 


Chloride   105   ()  mmol/L


 


Carbon Dioxide   23.7   (21.0-32.0)  mmol/L


 


BUN   17   (7.0-18.0)  mg/dL


 


Creatinine   1.3   (0.8-1.3)  mg/dL


 


Est Cr Clr Drug Dosing   43.11   mL/min


 


Estimated GFR (MDRD)   52.1   ml/min


 


Glucose   108 H   ()  mg/dL


 


POC Glucose  90   105 H  (70-99)  mg/dL


 


Calcium   8.1 L   (8.5-10.1)  mg/dL


 


Total Bilirubin   0.8   (0.2-1.0)  mg/dL


 


AST   29   (15-37)  IU/L


 


ALT   28   (14-63)  IU/L


 


Alkaline Phosphatase   119 H   ()  U/L


 


Total Protein   6.2 L   (6.4-8.2)  g/dL


 


Albumin   3.2 L   (3.4-5.0)  g/dL


 


Globulin   3.0   (2.6-4.0)  g/dL


 


Albumin/Globulin Ratio   1.1   (0.9-1.6)  














  12/26/21 Range/Units





  12:27 


 


Sodium   (136-148)  mmol/L


 


Potassium   (3.5-5.1)  mmol/L


 


Chloride   ()  mmol/L


 


Carbon Dioxide   (21.0-32.0)  mmol/L


 


BUN   (7.0-18.0)  mg/dL


 


Creatinine   (0.8-1.3)  mg/dL


 


Est Cr Clr Drug Dosing   mL/min


 


Estimated GFR (MDRD)   ml/min


 


Glucose   ()  mg/dL


 


POC Glucose  117 H  (70-99)  mg/dL


 


Calcium   (8.5-10.1)  mg/dL


 


Total Bilirubin   (0.2-1.0)  mg/dL


 


AST   (15-37)  IU/L


 


ALT   (14-63)  IU/L


 


Alkaline Phosphatase   ()  U/L


 


Total Protein   (6.4-8.2)  g/dL


 


Albumin   (3.4-5.0)  g/dL


 


Globulin   (2.6-4.0)  g/dL


 


Albumin/Globulin Ratio   (0.9-1.6)  











Result Diagrams: 


                                 12/25/21 07:15





                                 12/26/21 05:39





Sepsis Event Note





- Evaluation


Sepsis Screening Result: No Definite Risk





- Focused Exam


Vital Signs: 


                                   Vital Signs











  Temp Pulse Resp BP Pulse Ox


 


 12/26/21 11:00  36.4 C  84  19  117/67  97


 


 12/26/21 07:00  36.2 C  84  20  123/66  92 L


 


 12/26/21 03:11  36.7 C  98  20  105/56 L  93 L














- Problem List & Annotations


(1) Ambulatory dysfunction


SNOMED Code(s): 101079760


   Code(s): R26.2 - DIFFICULTY IN WALKING, NOT ELSEWHERE CLASSIFIED   Status: 

Acute   Priority: High   Current Visit: Yes   Onset Date: ~12/25/21   





(2) COVID-19 determined by clinical diagnostic criteria


SNOMED Code(s): 317325696, 699628816


   Code(s): U07.1 - COVID-19   Status: Acute   Priority: High   Current Visit: 

Yes   Annotation/Comment:: No evidence of fever, infection, respiratory 

difficulty.  Concern might be for a sequela of vasculitis affecting 

cerebrovascular function.  We will obtain an MRI.  No specific antiplatelet 

therapy at present.   





- Problem List Review


Problem List Initiated/Reviewed/Updated: Yes





- My Orders


Last 24 Hours: 


My Active Orders





12/26/21 11:45


Communication Order [RC] PER UNIT ROUTINE 





12/27/21 05:11


CBC WITH AUTO DIFF [HEME] AM 


COMPREHENSIVE METABOLIC PN,CMP [CHEM] AM 


CRP [C-REACTIVE PROTEIN] [CHEM] AM 














- Assessment


Assessment:: 





12/26/21


 patient   cont.  to  feel  weak,  denies  vertigo


 vss. spech  clear  and  ox4.


  no  focal  weakness /  no  arm  drift/  minimal  tremor   both  hands .


  no  lower  extremity  weakness. 


  cannot  stand  without   assist  of  2 .  shuffling    gait.


   lab   stable 





  assess:  sudden  onset  of  weakness,  etiol.  unknown .  


no  definitive  cva but  


   hx  of  previous  covid     in  recent  past .   mr and   vasculitis  

concerns. \


   hx  of  dm 


   advanced  age and  risk   factors  for   post  covid  complications.  


   dm  stable .gris





- Plan


Plan:: 


12/26/21


 patient   cont.  to  feel  weak,  denies  vertigo


 vss. speech  clear  and  ox4.


  no  focal  weakness /  no  arm  drift/  minimal  tremor   both  hands .


  no  lower  extremity  weakness. 


  cannot  stand  without   assist  of  2 .  shuffling    gait.


   lab   stable 





  assess:  sudden  onset  of  weakness,  etiol.  unknown .  


no  definitive  cva but  


   hx  of  previous  covid     in  recent  past .   mr and   vasculitis  

concerns. \


   hx  of  dm 


   advanced  age and  risk   factors  for   post  covid  complications.  


   dm  stable .gris

## 2021-12-27 LAB
BUN SERPL-MCNC: 17 MG/DL (ref 7–18)
CHLORIDE SERPL-SCNC: 107 MMOL/L (ref 98–107)
CO2 SERPL-SCNC: 26.2 MMOL/L (ref 21–32)
GLUCOSE SERPL-MCNC: 103 MG/DL (ref 74–106)
POTASSIUM SERPL-SCNC: 3.9 MMOL/L (ref 3.5–5.1)
SODIUM SERPL-SCNC: 142 MMOL/L (ref 136–148)

## 2021-12-27 RX ADMIN — DEXTROSE SCH UNITS: 10 SOLUTION INTRAVENOUS at 18:18

## 2021-12-27 NOTE — MR
INDICATION:



Acute onset gait disturbance.



TECHNIQUE:



MRI brain: Multiplanar multisequence MR imaging acquired prior to and 

following intravenous contrast.



MRA head: Time-of-flight imaging acquired.



MRA neck: Time-of-flight and postcontrast imaging acquired.



COMPARISON:



CTA head and neck 12/24/2021.



FINDINGS:



MRI brain:



Motion artifact degrades postcontrast sequences.



Prominence of the ventricles and sulci compatible with mild-to-moderate 

diffuse cerebral volume loss. No mass effect or midline shift. Patchy T2 

FLAIR hyperintensities in the supratentorial white matter, typical for mild 

chronic microvascular ischemic changes.



No diffusion restriction to suggest acute infarction. No intracranial 

hemorrhage or pathologic extra-axial fluid collection. No pathologic 

intracranial enhancement within exam limitations. 



The major arterial flow voids of the skullbase are preserved. Thinning of 

the ocular lenses. Mild-to-moderate ethmoid and maxillary sinus mucosal 

thickening. Small bilateral mastoid effusions.



MRA head:



Artifact degrades image quality.



The visualized internal carotid, middle cerebral, and anterior cerebral 

arteries are widely patent.



The vertebral, basilar, and posterior cerebral arteries are widely patent. 

Fetal origin left posterior cerebral artery.



No intracranial aneurysm.



MRA neck:



Artifact degrades image quality.



The common carotid arteries are widely patent.



Mild (less than 50 percent) narrowing of the proximal right cervical 

internal carotid artery.



The left cervical internal carotid artery is widely patent.



The vertebral arteries are codominant and widely patent.



IMPRESSION:



1. No acute intracranial abnormality.



2. Mild chronic microvascular ischemic changes and mild-to-moderate diffuse 

cerebral volume loss.



3. Unremarkable MRA of the head.



4. Mild (less than 50 percent) narrowing of the proximal right cervical 

internal carotid artery.



Dictated by Dalton Samuels MD @ 12/27/2021 2:53:11 PM



(Electronically Signed)

## 2021-12-27 NOTE — PCM.PN
- General Info


Date of Service: 12/27/21


Subjective Update: 


The patient is a 88-year-old  male, on day 4 of service, who has a 

significant past medical history of diabetes mellitus type 2 and hypertension, 

who was admitted to the medical floor due to ataxia without any insults seen on 

imaging.  Upon interview with the patient today, he admits that his left-sided 

weakness in regards to his lower extremity has subsided.  He does not have any 

slurred speech, changes in voice or vision, abnormalities in regards to strength

or sensations in the upper extremities, tingling, numbness, or seizures.  I 

spoke to physical therapy today who will come see the patient in order to build 

up his strength and assess his ambulation.  Speech therapy will also come work 

with this patient to assess his swallowing and speech.  He had a 6-second pause 

today in regards to telemetry/echocardiogram and as a result when he is d

ischarged I will send him home with a Zio patch.  The patient admits that he has

a good appetite and denies chest pain, palpitations, diaphoresis, nausea, 

vomiting, dizziness, and headache.  He has no other health concerns at this 

time.








- Review of Systems


General: Reports: Weakness.  Denies: Fatigue


HEENT: Denies: Headaches, Sore Throat


Pulmonary: Denies: Shortness of Breath, Cough


Cardiovascular: Denies: Chest Pain, Palpitations


Gastrointestinal: Denies: Abdominal Pain, Nausea, Vomiting


Genitourinary: Denies: Dysuria


Neurological: Reports: Difficulty Walking.  Denies: Confusion, Dizziness, 

Headache, Numbness





- Patient Data


Vitals - Most Recent: 


                                Last Vital Signs











Temp  97.8 F   12/27/21 12:00


 


Pulse  78   12/27/21 12:00


 


Resp  18   12/27/21 12:00


 


BP  118/69   12/27/21 12:00


 


Pulse Ox  97   12/27/21 12:00











Weight - Most Recent: 202 lb 8 oz


I&O - Last 24 Hours: 


                                 Intake & Output











 12/27/21 12/27/21 12/27/21





 06:59 14:59 22:59


 


Intake Total 200  


 


Output Total 350  


 


Balance -150  











Lab Results Last 24 Hours: 


                         Laboratory Results - last 24 hr











  12/26/21 12/27/21 12/27/21 Range/Units





  17:26 05:57 05:57 


 


WBC   4.79   (4.0-11.0)  K/uL


 


RBC   4.08 L   (4.50-5.90)  M/uL


 


Hgb   13.2   (13.0-17.0)  g/dL


 


Hct   39.9   (38.0-50.0)  %


 


MCV   97.8   (80.0-98.0)  fL


 


MCH   32.4 H   (27.0-32.0)  pg


 


MCHC   33.1   (31.0-37.0)  g/dL


 


RDW Std Deviation   50.9   (28.0-62.0)  fl


 


RDW Coeff of Noah   14   (11.0-15.0)  %


 


Plt Count   175   (150-400)  K/uL


 


MPV   11.20   (7.40-12.00)  fL


 


Neut % (Auto)   57.2   (48.0-80.0)  %


 


Lymph % (Auto)   26.5   (16.0-40.0)  %


 


Mono % (Auto)   12.1   (0.0-15.0)  %


 


Eos % (Auto)   3.8   (0.0-7.0)  %


 


Baso % (Auto)   0.4   (0.0-1.5)  %


 


Neut # (Auto)   2.7   (1.4-5.7)  K/uL


 


Lymph # (Auto)   1.3   (0.6-2.4)  K/uL


 


Mono # (Auto)   0.6   (0.0-0.8)  K/uL


 


Eos # (Auto)   0.2   (0.0-0.7)  K/uL


 


Baso # (Auto)   0.0   (0.0-0.1)  K/uL


 


Nucleated RBC %   0.0   /100WBC


 


Nucleated RBCs #   0   K/uL


 


Sodium    142  (136-148)  mmol/L


 


Potassium    3.9  (3.5-5.1)  mmol/L


 


Chloride    107  ()  mmol/L


 


Carbon Dioxide    26.2  (21.0-32.0)  mmol/L


 


BUN    17  (7.0-18.0)  mg/dL


 


Creatinine    1.1  (0.8-1.3)  mg/dL


 


Est Cr Clr Drug Dosing    50.95  mL/min


 


Estimated GFR (MDRD)    > 60.0  ml/min


 


Glucose    103  ()  mg/dL


 


POC Glucose  116 H    (70-99)  mg/dL


 


Calcium    8.1 L  (8.5-10.1)  mg/dL


 


Total Bilirubin    0.8  (0.2-1.0)  mg/dL


 


AST    21  (15-37)  IU/L


 


ALT    30  (14-63)  IU/L


 


Alkaline Phosphatase    112  ()  U/L


 


C-Reactive Protein    1.40 H  (0.00-0.90)  mg/dL


 


Total Protein    6.1 L  (6.4-8.2)  g/dL


 


Albumin    3.0 L  (3.4-5.0)  g/dL


 


Globulin    3.1  (2.6-4.0)  g/dL


 


Albumin/Globulin Ratio    1.0  (0.9-1.6)  














  12/27/21 12/27/21 Range/Units





  06:40 11:22 


 


WBC    (4.0-11.0)  K/uL


 


RBC    (4.50-5.90)  M/uL


 


Hgb    (13.0-17.0)  g/dL


 


Hct    (38.0-50.0)  %


 


MCV    (80.0-98.0)  fL


 


MCH    (27.0-32.0)  pg


 


MCHC    (31.0-37.0)  g/dL


 


RDW Std Deviation    (28.0-62.0)  fl


 


RDW Coeff of Noah    (11.0-15.0)  %


 


Plt Count    (150-400)  K/uL


 


MPV    (7.40-12.00)  fL


 


Neut % (Auto)    (48.0-80.0)  %


 


Lymph % (Auto)    (16.0-40.0)  %


 


Mono % (Auto)    (0.0-15.0)  %


 


Eos % (Auto)    (0.0-7.0)  %


 


Baso % (Auto)    (0.0-1.5)  %


 


Neut # (Auto)    (1.4-5.7)  K/uL


 


Lymph # (Auto)    (0.6-2.4)  K/uL


 


Mono # (Auto)    (0.0-0.8)  K/uL


 


Eos # (Auto)    (0.0-0.7)  K/uL


 


Baso # (Auto)    (0.0-0.1)  K/uL


 


Nucleated RBC %    /100WBC


 


Nucleated RBCs #    K/uL


 


Sodium    (136-148)  mmol/L


 


Potassium    (3.5-5.1)  mmol/L


 


Chloride    ()  mmol/L


 


Carbon Dioxide    (21.0-32.0)  mmol/L


 


BUN    (7.0-18.0)  mg/dL


 


Creatinine    (0.8-1.3)  mg/dL


 


Est Cr Clr Drug Dosing    mL/min


 


Estimated GFR (MDRD)    ml/min


 


Glucose    ()  mg/dL


 


POC Glucose  103 H  160 H  (70-99)  mg/dL


 


Calcium    (8.5-10.1)  mg/dL


 


Total Bilirubin    (0.2-1.0)  mg/dL


 


AST    (15-37)  IU/L


 


ALT    (14-63)  IU/L


 


Alkaline Phosphatase    ()  U/L


 


C-Reactive Protein    (0.00-0.90)  mg/dL


 


Total Protein    (6.4-8.2)  g/dL


 


Albumin    (3.4-5.0)  g/dL


 


Globulin    (2.6-4.0)  g/dL


 


Albumin/Globulin Ratio    (0.9-1.6)  











Med Orders - Current: 


                               Current Medications





Allopurinol (Allopurinol 100 Mg Tab)  100 mg PO DAILY [facility]


   Last Admin: 12/27/21 08:49 Dose:  100 mg


   Documented by: 


Atorvastatin Calcium (Atorvastatin 20 Mg Tab)  20 mg PO BEDTIME [facility]


   Last Admin: 12/26/21 20:27 Dose:  20 mg


   Documented by: 


Dextrose/Water (50% Dextrose In Water 50 Ml Syringe)  50 ml IVPUSH ASDIRECTED 

PRN


   PRN Reason: Hypoglycemia


Furosemide (Furosemide 40 Mg Tab)  40 mg PO DAILY [facility]


   Last Admin: 12/27/21 08:50 Dose:  40 mg


   Documented by: 


Glucagon (Glucagon,Human Recombinant 1 Mg Vial)  1 mg IM ASDIRECTED PRN


   PRN Reason: Hypoglycemia


Heparin Sodium (Porcine) (Heparin Sodium 5,000 Units/Ml Vial)  5,000 units 

SUBCUT Q12H [facility]


Insulin Aspart (Insulin Aspart 100 Units/Ml 3 Ml Pen)  0 unit SUBCUT TIDAC [facility]; 

Protocol


   Last Admin: 12/27/21 11:25 Dose:  1 unit


   Documented by: 


Polyethylene Glycol (Polyethylene Glycol 3350 Powder 17 Gm Packet)  17 gm PO 

DAILY [facility]


   Last Admin: 12/27/21 08:49 Dose:  Not Given


   Documented by: 


Sodium Chloride (Sodium Chloride 0.9% 10 Ml Syringe)  10 ml FLUSH ASDIRECTED PRN


   PRN Reason: Keep Vein Open


   Last Admin: 12/24/21 19:34 Dose:  10 ml


   Documented by: 


Sodium Chloride (Sodium Chloride 0.9% 2.5 Ml Syringe)  2.5 ml FLUSH ASDIRECTED 

PRN


   PRN Reason: Keep Vein Open


   Last Admin: 12/24/21 19:34 Dose:  2.5 ml


   Documented by: 


Sodium Chloride (Sodium Chloride 0.9% 20 Ml Sdv)  10 ml IV ASDIRECTED PRN


   PRN Reason: IV Use





Discontinued Medications





Aspirin (Aspirin 81 Mg Tab.Chew)  324 mg PO ONETIME ONE


   Stop: 12/24/21 20:05


   Last Admin: 12/24/21 20:20 Dose:  324 mg


   Documented by: 


Gadobenate Dimeglumine (Gadobenate Dimeglumine 529 Mg/Ml 20 Ml Sdv)  20 ml 

IVPUSH ONETIME STA


   Stop: 12/27/21 12:18


   Last Admin: 12/27/21 13:06 Dose:  19 ml


   Documented by: 


Sodium Chloride (Normal Saline)  1,000 mls @ 75 mls/hr IV ASDIRECTED RENEA


   Stop: 12/25/21 13:04


   Last Admin: 12/25/21 00:54 Dose:  75 mls/hr


   Documented by: 


Iopamidol (Iopamidol 755 Mg/Ml 500 Ml Multipack Bottle)  100 ml IVPUSH ONETIME 

ONE


   Stop: 12/24/21 19:03


   Last Admin: 12/25/21 07:04 Dose:  Not Given


   Documented by: 


Metformin HCl (Metformin 500 Mg Tab.Er)  500 mg PO BIDMEALS [facility]


   Last Admin: 12/25/21 09:42 Dose:  Not Given


   Documented by: 


Metformin HCl (Metformin 500 Mg Tab.Er)  500 mg PO BIDMEALS [facility]


   Last Admin: 12/25/21 10:39 Dose:  500 mg


   Documented by: 











- Exam


General: Alert, Oriented, Cooperative


HEENT: Mucous Membr. Moist/Pink


Neck: Trachea Midline


Lungs: Clear to Auscultation, Normal Respiratory Effort


Cardiovascular: Regular Rate, Regular Rhythm


GI/Abdominal Exam: Normal Bowel Sounds, Soft, Non-Tender


Neurological: No New Focal Deficit, Normal Speech, Sensation Intact





- Patient Data


Lab Results Last 24 hrs: 


                         Laboratory Results - last 24 hr











  12/26/21 12/27/21 12/27/21 Range/Units





  17:26 05:57 05:57 


 


WBC   4.79   (4.0-11.0)  K/uL


 


RBC   4.08 L   (4.50-5.90)  M/uL


 


Hgb   13.2   (13.0-17.0)  g/dL


 


Hct   39.9   (38.0-50.0)  %


 


MCV   97.8   (80.0-98.0)  fL


 


MCH   32.4 H   (27.0-32.0)  pg


 


MCHC   33.1   (31.0-37.0)  g/dL


 


RDW Std Deviation   50.9   (28.0-62.0)  fl


 


RDW Coeff of Noah   14   (11.0-15.0)  %


 


Plt Count   175   (150-400)  K/uL


 


MPV   11.20   (7.40-12.00)  fL


 


Neut % (Auto)   57.2   (48.0-80.0)  %


 


Lymph % (Auto)   26.5   (16.0-40.0)  %


 


Mono % (Auto)   12.1   (0.0-15.0)  %


 


Eos % (Auto)   3.8   (0.0-7.0)  %


 


Baso % (Auto)   0.4   (0.0-1.5)  %


 


Neut # (Auto)   2.7   (1.4-5.7)  K/uL


 


Lymph # (Auto)   1.3   (0.6-2.4)  K/uL


 


Mono # (Auto)   0.6   (0.0-0.8)  K/uL


 


Eos # (Auto)   0.2   (0.0-0.7)  K/uL


 


Baso # (Auto)   0.0   (0.0-0.1)  K/uL


 


Nucleated RBC %   0.0   /100WBC


 


Nucleated RBCs #   0   K/uL


 


Sodium    142  (136-148)  mmol/L


 


Potassium    3.9  (3.5-5.1)  mmol/L


 


Chloride    107  ()  mmol/L


 


Carbon Dioxide    26.2  (21.0-32.0)  mmol/L


 


BUN    17  (7.0-18.0)  mg/dL


 


Creatinine    1.1  (0.8-1.3)  mg/dL


 


Est Cr Clr Drug Dosing    50.95  mL/min


 


Estimated GFR (MDRD)    > 60.0  ml/min


 


Glucose    103  ()  mg/dL


 


POC Glucose  116 H    (70-99)  mg/dL


 


Calcium    8.1 L  (8.5-10.1)  mg/dL


 


Total Bilirubin    0.8  (0.2-1.0)  mg/dL


 


AST    21  (15-37)  IU/L


 


ALT    30  (14-63)  IU/L


 


Alkaline Phosphatase    112  ()  U/L


 


C-Reactive Protein    1.40 H  (0.00-0.90)  mg/dL


 


Total Protein    6.1 L  (6.4-8.2)  g/dL


 


Albumin    3.0 L  (3.4-5.0)  g/dL


 


Globulin    3.1  (2.6-4.0)  g/dL


 


Albumin/Globulin Ratio    1.0  (0.9-1.6)  














  12/27/21 12/27/21 Range/Units





  06:40 11:22 


 


WBC    (4.0-11.0)  K/uL


 


RBC    (4.50-5.90)  M/uL


 


Hgb    (13.0-17.0)  g/dL


 


Hct    (38.0-50.0)  %


 


MCV    (80.0-98.0)  fL


 


MCH    (27.0-32.0)  pg


 


MCHC    (31.0-37.0)  g/dL


 


RDW Std Deviation    (28.0-62.0)  fl


 


RDW Coeff of Noah    (11.0-15.0)  %


 


Plt Count    (150-400)  K/uL


 


MPV    (7.40-12.00)  fL


 


Neut % (Auto)    (48.0-80.0)  %


 


Lymph % (Auto)    (16.0-40.0)  %


 


Mono % (Auto)    (0.0-15.0)  %


 


Eos % (Auto)    (0.0-7.0)  %


 


Baso % (Auto)    (0.0-1.5)  %


 


Neut # (Auto)    (1.4-5.7)  K/uL


 


Lymph # (Auto)    (0.6-2.4)  K/uL


 


Mono # (Auto)    (0.0-0.8)  K/uL


 


Eos # (Auto)    (0.0-0.7)  K/uL


 


Baso # (Auto)    (0.0-0.1)  K/uL


 


Nucleated RBC %    /100WBC


 


Nucleated RBCs #    K/uL


 


Sodium    (136-148)  mmol/L


 


Potassium    (3.5-5.1)  mmol/L


 


Chloride    ()  mmol/L


 


Carbon Dioxide    (21.0-32.0)  mmol/L


 


BUN    (7.0-18.0)  mg/dL


 


Creatinine    (0.8-1.3)  mg/dL


 


Est Cr Clr Drug Dosing    mL/min


 


Estimated GFR (MDRD)    ml/min


 


Glucose    ()  mg/dL


 


POC Glucose  103 H  160 H  (70-99)  mg/dL


 


Calcium    (8.5-10.1)  mg/dL


 


Total Bilirubin    (0.2-1.0)  mg/dL


 


AST    (15-37)  IU/L


 


ALT    (14-63)  IU/L


 


Alkaline Phosphatase    ()  U/L


 


C-Reactive Protein    (0.00-0.90)  mg/dL


 


Total Protein    (6.4-8.2)  g/dL


 


Albumin    (3.4-5.0)  g/dL


 


Globulin    (2.6-4.0)  g/dL


 


Albumin/Globulin Ratio    (0.9-1.6)  











Result Diagrams: 


                                 12/27/21 05:57





                                 12/27/21 05:57





Sepsis Event Note





- Evaluation


Sepsis Screening Result: No Definite Risk





- Focused Exam


Vital Signs: 


                                   Vital Signs











  Temp Pulse Resp BP Pulse Ox Pulse Ox


 


 12/27/21 12:00  97.8 F  78  18  118/69  97 


 


 12/27/21 08:48  97.1 F  86  20  104/61  94 L 


 


 12/27/21 06:00       98














- Problem List & Annotations


(1) Diabetes mellitus


SNOMED Code(s): 76856968


   Code(s): E11.9 - TYPE 2 DIABETES MELLITUS WITHOUT COMPLICATIONS   Status: 

Acute   Current Visit: Yes   





(2) Ambulatory dysfunction


SNOMED Code(s): 517754404


   Code(s): R26.2 - DIFFICULTY IN WALKING, NOT ELSEWHERE CLASSIFIED   Status: 

Acute   Priority: High   Current Visit: Yes   Onset Date: ~12/25/21   





(3) Ataxia following unspecified cerebrovascular disease


SNOMED Code(s): 82086833359854


   Code(s): I69.993 - ATAXIA FOLLOWING UNSPECIFIED CEREBROVASCULAR DISEASE   

Status: Acute   Current Visit: Yes   Annotation/Comment:: Maintain consistent 

care for fall risk.  Observe patient.  MRA studies of both head and neck are pl

anned.  PT/OT/ST consults plan.   





- Problem List Review


Problem List Initiated/Reviewed/Updated: Yes





- Assessment


Assessment:: 





1. Ambulatory dysfunction/ataxia secondary to unspecified cerebrovascular 

disease


-Physical therapy will come work with this patient in order to build up his 

strength/help with ambulation in order to perform his activities of daily living


-Speech therapy will come work with this patient to assess his 

swallowing/speech, we will follow their recommendations moving forward


-Continue with atorvastatin home dosage to prevent cholesterol deposition





2.  Diabetes


-Continue with NovoLog/Accu-Cheks


-Continue with diabetic diet


-Daily BMP/CBC

## 2021-12-27 NOTE — MR
INDICATION:



Acute onset gait disturbance.



TECHNIQUE:



MRI brain: Multiplanar multisequence MR imaging acquired prior to and 

following intravenous contrast.



MRA head: Time-of-flight imaging acquired.



MRA neck: Time-of-flight and postcontrast imaging acquired.



COMPARISON:



CTA head and neck 12/24/2021.



FINDINGS:



MRI brain:



Motion artifact degrades postcontrast sequences.



Prominence of the ventricles and sulci compatible with mild-to-moderate 

diffuse cerebral volume loss. No mass effect or midline shift. Patchy T2 

FLAIR hyperintensities in the supratentorial white matter, typical for mild 

chronic microvascular ischemic changes.



No diffusion restriction to suggest acute infarction. No intracranial 

hemorrhage or pathologic extra-axial fluid collection. No pathologic 

intracranial enhancement within exam limitations. 



The major arterial flow voids of the skullbase are preserved. Thinning of 

the ocular lenses. Mild-to-moderate ethmoid and maxillary sinus mucosal 

thickening. Small bilateral mastoid effusions.



MRA head:



Artifact degrades image quality.



The visualized internal carotid, middle cerebral, and anterior cerebral 

arteries are widely patent.



The vertebral, basilar, and posterior cerebral arteries are widely patent. 

Fetal origin left posterior cerebral artery.



No intracranial aneurysm.



MRA neck:



Artifact degrades image quality.



The common carotid arteries are widely patent.



Mild (less than 50 percent) narrowing of the proximal right cervical 

internal carotid artery.



The left cervical internal carotid artery is widely patent.



The vertebral arteries are codominant and widely patent.



IMPRESSION:



1. No acute intracranial abnormality.



2. Mild chronic microvascular ischemic changes and mild-to-moderate diffuse 

cerebral volume loss.



3. Unremarkable MRA of the head.



4. Mild (less than 50 percent) narrowing of the proximal right cervical 

internal carotid artery.



Dictated by Dalton Samuels MD @ 12/27/2021 2:52:48 PM



(Electronically Signed)

## 2021-12-27 NOTE — PCM.PN
- General Info


Date of Service: 12/25/21 (Late Entry)


Subjective Update: 


Patient has no additional symptoms or clinical ifficulties from yesterday.  He 

is able to raise his incentive spirometer near the top of the limit.  He is 

eating and drinking.  Patient does admit that he has some situational stress 

with regard to his personal economic and business problems while being hospital.





Functional Status: Reports: Pain Controlled





- Review of Systems


General: Reports: Weakness, Fatigue


HEENT: Reports: No Symptoms


Pulmonary: Reports: Shortness of Breath, Cough (Cough is not worse.  It is 

managed with current medications including Combivent.)


Cardiovascular: Denies: Chest Pain, Palpitations


Gastrointestinal: Reports: Constipation


Genitourinary: Reports: No Symptoms


Musculoskeletal: Reports: Shoulder Pain (Appear to be the same as chronic 

pains), Back Pain


Skin: Reports: No Symptoms


Neurological: Reports: No Symptoms


Psychiatric: Reports: Depression (Patient explains in detail he has personal 

concerns about fear of theft from his personal estate while he is in hospital.)





- Patient Data


Vitals - Most Recent: 


                                Last Vital Signs











Temp  97.9 F   12/27/21 20:26


 


Pulse  71   12/27/21 20:26


 


Resp  20   12/27/21 20:26


 


BP  124/69   12/27/21 20:26


 


Pulse Ox  94 L  12/27/21 20:26











Weight - Most Recent: 202 lb 8 oz


I&O - Last 24 Hours: 


                                 Intake & Output











 12/27/21 12/27/21 12/27/21





 06:59 14:59 22:59


 


Intake Total 200  800


 


Output Total 350  1000


 


Balance -150  -200











Lab Results Last 24 Hours: 


                         Laboratory Results - last 24 hr











  12/27/21 12/27/21 12/27/21 Range/Units





  05:57 05:57 06:40 


 


WBC  4.79    (4.0-11.0)  K/uL


 


RBC  4.08 L    (4.50-5.90)  M/uL


 


Hgb  13.2    (13.0-17.0)  g/dL


 


Hct  39.9    (38.0-50.0)  %


 


MCV  97.8    (80.0-98.0)  fL


 


MCH  32.4 H    (27.0-32.0)  pg


 


MCHC  33.1    (31.0-37.0)  g/dL


 


RDW Std Deviation  50.9    (28.0-62.0)  fl


 


RDW Coeff of Noah  14    (11.0-15.0)  %


 


Plt Count  175    (150-400)  K/uL


 


MPV  11.20    (7.40-12.00)  fL


 


Neut % (Auto)  57.2    (48.0-80.0)  %


 


Lymph % (Auto)  26.5    (16.0-40.0)  %


 


Mono % (Auto)  12.1    (0.0-15.0)  %


 


Eos % (Auto)  3.8    (0.0-7.0)  %


 


Baso % (Auto)  0.4    (0.0-1.5)  %


 


Neut # (Auto)  2.7    (1.4-5.7)  K/uL


 


Lymph # (Auto)  1.3    (0.6-2.4)  K/uL


 


Mono # (Auto)  0.6    (0.0-0.8)  K/uL


 


Eos # (Auto)  0.2    (0.0-0.7)  K/uL


 


Baso # (Auto)  0.0    (0.0-0.1)  K/uL


 


Nucleated RBC %  0.0    /100WBC


 


Nucleated RBCs #  0    K/uL


 


Sodium   142   (136-148)  mmol/L


 


Potassium   3.9   (3.5-5.1)  mmol/L


 


Chloride   107   ()  mmol/L


 


Carbon Dioxide   26.2   (21.0-32.0)  mmol/L


 


BUN   17   (7.0-18.0)  mg/dL


 


Creatinine   1.1   (0.8-1.3)  mg/dL


 


Est Cr Clr Drug Dosing   50.95   mL/min


 


Estimated GFR (MDRD)   > 60.0   ml/min


 


Glucose   103   ()  mg/dL


 


POC Glucose    103 H  (70-99)  mg/dL


 


Calcium   8.1 L   (8.5-10.1)  mg/dL


 


Total Bilirubin   0.8   (0.2-1.0)  mg/dL


 


AST   21   (15-37)  IU/L


 


ALT   30   (14-63)  IU/L


 


Alkaline Phosphatase   112   ()  U/L


 


C-Reactive Protein   1.40 H   (0.00-0.90)  mg/dL


 


Total Protein   6.1 L   (6.4-8.2)  g/dL


 


Albumin   3.0 L   (3.4-5.0)  g/dL


 


Globulin   3.1   (2.6-4.0)  g/dL


 


Albumin/Globulin Ratio   1.0   (0.9-1.6)  














  12/27/21 12/27/21 Range/Units





  11:22 16:35 


 


WBC    (4.0-11.0)  K/uL


 


RBC    (4.50-5.90)  M/uL


 


Hgb    (13.0-17.0)  g/dL


 


Hct    (38.0-50.0)  %


 


MCV    (80.0-98.0)  fL


 


MCH    (27.0-32.0)  pg


 


MCHC    (31.0-37.0)  g/dL


 


RDW Std Deviation    (28.0-62.0)  fl


 


RDW Coeff of Noah    (11.0-15.0)  %


 


Plt Count    (150-400)  K/uL


 


MPV    (7.40-12.00)  fL


 


Neut % (Auto)    (48.0-80.0)  %


 


Lymph % (Auto)    (16.0-40.0)  %


 


Mono % (Auto)    (0.0-15.0)  %


 


Eos % (Auto)    (0.0-7.0)  %


 


Baso % (Auto)    (0.0-1.5)  %


 


Neut # (Auto)    (1.4-5.7)  K/uL


 


Lymph # (Auto)    (0.6-2.4)  K/uL


 


Mono # (Auto)    (0.0-0.8)  K/uL


 


Eos # (Auto)    (0.0-0.7)  K/uL


 


Baso # (Auto)    (0.0-0.1)  K/uL


 


Nucleated RBC %    /100WBC


 


Nucleated RBCs #    K/uL


 


Sodium    (136-148)  mmol/L


 


Potassium    (3.5-5.1)  mmol/L


 


Chloride    ()  mmol/L


 


Carbon Dioxide    (21.0-32.0)  mmol/L


 


BUN    (7.0-18.0)  mg/dL


 


Creatinine    (0.8-1.3)  mg/dL


 


Est Cr Clr Drug Dosing    mL/min


 


Estimated GFR (MDRD)    ml/min


 


Glucose    ()  mg/dL


 


POC Glucose  160 H  148 H  (70-99)  mg/dL


 


Calcium    (8.5-10.1)  mg/dL


 


Total Bilirubin    (0.2-1.0)  mg/dL


 


AST    (15-37)  IU/L


 


ALT    (14-63)  IU/L


 


Alkaline Phosphatase    ()  U/L


 


C-Reactive Protein    (0.00-0.90)  mg/dL


 


Total Protein    (6.4-8.2)  g/dL


 


Albumin    (3.4-5.0)  g/dL


 


Globulin    (2.6-4.0)  g/dL


 


Albumin/Globulin Ratio    (0.9-1.6)  











Med Orders - Current: 


                               Current Medications





Allopurinol (Allopurinol 100 Mg Tab)  100 mg PO DAILY [facility]


   Last Admin: 12/27/21 08:49 Dose:  100 mg


   Documented by: 


Aspirin (Aspirin 325 Mg Tab)  325 mg PO DAILY [facility]


   Last Admin: 12/27/21 21:31 Dose:  325 mg


   Documented by: 


Atorvastatin Calcium (Atorvastatin 20 Mg Tab)  20 mg PO BEDTIME [facility]


   Last Admin: 12/27/21 21:30 Dose:  20 mg


   Documented by: 


Dextrose/Water (50% Dextrose In Water 50 Ml Syringe)  50 ml IVPUSH ASDIRECTED 

PRN


   PRN Reason: Hypoglycemia


Furosemide (Furosemide 40 Mg Tab)  40 mg PO DAILY [facility]


   Last Admin: 12/27/21 08:50 Dose:  40 mg


   Documented by: 


Glucagon (Glucagon,Human Recombinant 1 Mg Vial)  1 mg IM ASDIRECTED PRN


   PRN Reason: Hypoglycemia


Heparin Sodium (Porcine) (Heparin Sodium 5,000 Units/Ml Vial)  5,000 units 

SUBCUT Q12H [facility]


   Last Admin: 12/27/21 18:18 Dose:  5,000 units


   Documented by: 


Insulin Aspart (Insulin Aspart 100 Units/Ml 3 Ml Pen)  0 unit SUBCUT TIDAC [facility]; 

Protocol


   Last Admin: 12/27/21 17:31 Dose:  Not Given


   Documented by: 


Polyethylene Glycol (Polyethylene Glycol 3350 Powder 17 Gm Packet)  17 gm PO 

DAILY [facility]


   Last Admin: 12/27/21 08:49 Dose:  Not Given


   Documented by: 


Sodium Chloride (Sodium Chloride 0.9% 10 Ml Syringe)  10 ml FLUSH ASDIRECTED PRN


   PRN Reason: Keep Vein Open


   Last Admin: 12/24/21 19:34 Dose:  10 ml


   Documented by: 


Sodium Chloride (Sodium Chloride 0.9% 2.5 Ml Syringe)  2.5 ml FLUSH ASDIRECTED 

PRN


   PRN Reason: Keep Vein Open


   Last Admin: 12/24/21 19:34 Dose:  2.5 ml


   Documented by: 


Sodium Chloride (Sodium Chloride 0.9% 20 Ml Sdv)  10 ml IV ASDIRECTED PRN


   PRN Reason: IV Use





Discontinued Medications





Aspirin (Aspirin 81 Mg Tab.Chew)  324 mg PO ONETIME ONE


   Stop: 12/24/21 20:05


   Last Admin: 12/24/21 20:20 Dose:  324 mg


   Documented by: 


Gadobenate Dimeglumine (Gadobenate Dimeglumine 529 Mg/Ml 20 Ml Sdv)  20 ml 

IVPUSH ONETIME STA


   Stop: 12/27/21 12:18


   Last Admin: 12/27/21 13:06 Dose:  19 ml


   Documented by: 


Sodium Chloride (Normal Saline)  1,000 mls @ 75 mls/hr IV ASDIRECTED RENEA


   Stop: 12/25/21 13:04


   Last Admin: 12/25/21 00:54 Dose:  75 mls/hr


   Documented by: 


Iopamidol (Iopamidol 755 Mg/Ml 500 Ml Multipack Bottle)  100 ml IVPUSH ONETIME 

ONE


   Stop: 12/24/21 19:03


   Last Admin: 12/25/21 07:04 Dose:  Not Given


   Documented by: 


Metformin HCl (Metformin 500 Mg Tab.Er)  500 mg PO BIDMEALS [facility]


   Last Admin: 12/25/21 09:42 Dose:  Not Given


   Documented by: 


Metformin HCl (Metformin 500 Mg Tab.Er)  500 mg PO BIDMEALS [facility]


   Last Admin: 12/25/21 10:39 Dose:  500 mg


   Documented by: 











- Exam


Quality Assessment: Supplemental Oxygen


General: Alert, Oriented


HEENT: Pupils Equal, Mucous Membr. Moist/Pink


Neck: Supple


Lungs: Clear to Auscultation, Normal Respiratory Effort


Cardiovascular: Regular Rate, Regular Rhythm


GI/Abdominal Exam: Soft, Non-Tender, No Distention


 (Male) Exam: Deferred


Extremities: Non-Tender, No Pedal Edema


Skin: Warm, Dry, Intact


Neurological: No New Focal Deficit


Psy/Mental Status: Alert, Normal Affect, Normal Mood





- Patient Data


Lab Results Last 24 hrs: 


                         Laboratory Results - last 24 hr











  12/27/21 12/27/21 12/27/21 Range/Units





  05:57 05:57 06:40 


 


WBC  4.79    (4.0-11.0)  K/uL


 


RBC  4.08 L    (4.50-5.90)  M/uL


 


Hgb  13.2    (13.0-17.0)  g/dL


 


Hct  39.9    (38.0-50.0)  %


 


MCV  97.8    (80.0-98.0)  fL


 


MCH  32.4 H    (27.0-32.0)  pg


 


MCHC  33.1    (31.0-37.0)  g/dL


 


RDW Std Deviation  50.9    (28.0-62.0)  fl


 


RDW Coeff of Noah  14    (11.0-15.0)  %


 


Plt Count  175    (150-400)  K/uL


 


MPV  11.20    (7.40-12.00)  fL


 


Neut % (Auto)  57.2    (48.0-80.0)  %


 


Lymph % (Auto)  26.5    (16.0-40.0)  %


 


Mono % (Auto)  12.1    (0.0-15.0)  %


 


Eos % (Auto)  3.8    (0.0-7.0)  %


 


Baso % (Auto)  0.4    (0.0-1.5)  %


 


Neut # (Auto)  2.7    (1.4-5.7)  K/uL


 


Lymph # (Auto)  1.3    (0.6-2.4)  K/uL


 


Mono # (Auto)  0.6    (0.0-0.8)  K/uL


 


Eos # (Auto)  0.2    (0.0-0.7)  K/uL


 


Baso # (Auto)  0.0    (0.0-0.1)  K/uL


 


Nucleated RBC %  0.0    /100WBC


 


Nucleated RBCs #  0    K/uL


 


Sodium   142   (136-148)  mmol/L


 


Potassium   3.9   (3.5-5.1)  mmol/L


 


Chloride   107   ()  mmol/L


 


Carbon Dioxide   26.2   (21.0-32.0)  mmol/L


 


BUN   17   (7.0-18.0)  mg/dL


 


Creatinine   1.1   (0.8-1.3)  mg/dL


 


Est Cr Clr Drug Dosing   50.95   mL/min


 


Estimated GFR (MDRD)   > 60.0   ml/min


 


Glucose   103   ()  mg/dL


 


POC Glucose    103 H  (70-99)  mg/dL


 


Calcium   8.1 L   (8.5-10.1)  mg/dL


 


Total Bilirubin   0.8   (0.2-1.0)  mg/dL


 


AST   21   (15-37)  IU/L


 


ALT   30   (14-63)  IU/L


 


Alkaline Phosphatase   112   ()  U/L


 


C-Reactive Protein   1.40 H   (0.00-0.90)  mg/dL


 


Total Protein   6.1 L   (6.4-8.2)  g/dL


 


Albumin   3.0 L   (3.4-5.0)  g/dL


 


Globulin   3.1   (2.6-4.0)  g/dL


 


Albumin/Globulin Ratio   1.0   (0.9-1.6)  














  12/27/21 12/27/21 Range/Units





  11:22 16:35 


 


WBC    (4.0-11.0)  K/uL


 


RBC    (4.50-5.90)  M/uL


 


Hgb    (13.0-17.0)  g/dL


 


Hct    (38.0-50.0)  %


 


MCV    (80.0-98.0)  fL


 


MCH    (27.0-32.0)  pg


 


MCHC    (31.0-37.0)  g/dL


 


RDW Std Deviation    (28.0-62.0)  fl


 


RDW Coeff of Noah    (11.0-15.0)  %


 


Plt Count    (150-400)  K/uL


 


MPV    (7.40-12.00)  fL


 


Neut % (Auto)    (48.0-80.0)  %


 


Lymph % (Auto)    (16.0-40.0)  %


 


Mono % (Auto)    (0.0-15.0)  %


 


Eos % (Auto)    (0.0-7.0)  %


 


Baso % (Auto)    (0.0-1.5)  %


 


Neut # (Auto)    (1.4-5.7)  K/uL


 


Lymph # (Auto)    (0.6-2.4)  K/uL


 


Mono # (Auto)    (0.0-0.8)  K/uL


 


Eos # (Auto)    (0.0-0.7)  K/uL


 


Baso # (Auto)    (0.0-0.1)  K/uL


 


Nucleated RBC %    /100WBC


 


Nucleated RBCs #    K/uL


 


Sodium    (136-148)  mmol/L


 


Potassium    (3.5-5.1)  mmol/L


 


Chloride    ()  mmol/L


 


Carbon Dioxide    (21.0-32.0)  mmol/L


 


BUN    (7.0-18.0)  mg/dL


 


Creatinine    (0.8-1.3)  mg/dL


 


Est Cr Clr Drug Dosing    mL/min


 


Estimated GFR (MDRD)    ml/min


 


Glucose    ()  mg/dL


 


POC Glucose  160 H  148 H  (70-99)  mg/dL


 


Calcium    (8.5-10.1)  mg/dL


 


Total Bilirubin    (0.2-1.0)  mg/dL


 


AST    (15-37)  IU/L


 


ALT    (14-63)  IU/L


 


Alkaline Phosphatase    ()  U/L


 


C-Reactive Protein    (0.00-0.90)  mg/dL


 


Total Protein    (6.4-8.2)  g/dL


 


Albumin    (3.4-5.0)  g/dL


 


Globulin    (2.6-4.0)  g/dL


 


Albumin/Globulin Ratio    (0.9-1.6)  











Result Diagrams: 


                                 12/27/21 05:57





                                 12/27/21 05:57





Sepsis Event Note





- Evaluation


Sepsis Screening Result: No Definite Risk





- Focused Exam


Vital Signs: 


                                   Vital Signs











  Temp Pulse Resp BP Pulse Ox


 


 12/27/21 20:26  97.9 F  71  20  124/69  94 L


 


 12/27/21 16:00  96.6 F L  66  22 H  129/74  94 L


 


 12/27/21 12:00  97.8 F  78  18  118/69  97














- Problem List & Annotations


(1) Ataxia following unspecified cerebrovascular disease


SNOMED Code(s): 73812582029857


   Code(s): I69.993 - ATAXIA FOLLOWING UNSPECIFIED CEREBROVASCULAR DISEASE   

Status: Acute   Priority: High   Current Visit: Yes   Onset Date: ~12/24/21   

Annotation/Comment:: Maintain consistent care for fall risk.  Observe patient.  

MRA studies of both head and neck are planned.  PT/OT/ST consults planned when 

first available. Transferring care by direct report to Dr. Evaristo Keller 20:00, 

12/25/2021.   





(2) COVID-19 determined by clinical diagnostic criteria


SNOMED Code(s): 827242032, 847480324


   Code(s): U07.1 - COVID-19   Status: Acute   Priority: High   Current Visit: 

Yes   Annotation/Comment:: No evidence of fever, infection, respiratory 

difficulty.  Concern might be for a sequela of vasculitis affecting 

cerebrovascular function.  We will obtain an MRI/MRA Of carotid, vertebral and 

cerebral arteries and brain. ED attending had consulted with a neurologist, Dr. Hook, in [place], 325 mg of aspirin had been administered, since CT revealed no 

evidence of hemorrhage.  For now will maintain patient on a baby aspirin a day 

pending further information from neurology.   





- Problem List Review


Problem List Initiated/Reviewed/Updated: Yes





- My Orders


Last 24 Hours: 


My Active Orders





12/27/21 09:52


Echo Comp wo Cont [US] Routine 














- Assessment


Assessment:: 





1. Ambulatory dysfunction/ataxia secondary to unspecified cerebrovascular 

disease


-Physical therapy will come work with this patient in order to build up his 

strength/help with ambulation in order to perform his activities of daily living


-Speech therapy will come work with this patient to assess his 

swallowing/speech, we will follow their recommendations moving forward


-Continue with atorvastatin home dosage to prevent cholesterol deposition





2.  Diabetes


-Continue with NovoLog/Accu-Cheks


-Continue with diabetic diet


-Daily BMP/CBC














- Plan


Plan:: 


12/26/21


 patient   cont.  to  feel  weak,  denies  vertigo


 vss. speech  clear  and  ox4.


  no  focal  weakness /  no  arm  drift/  minimal  tremor   both  hands .


  no  lower  extremity  weakness. 


  cannot  stand  without   assist  of  2 .  shuffling    gait.


   lab   stable 





  assess:  sudden  onset  of  weakness,  etiol.  unknown .  


no  definitive  cva but  


   hx  of  previous  covid     in  recent  past .   mr and   vasculitis  

concerns. \


   hx  of  dm 


   advanced  age and  risk   factors  for   post  covid  complications.  


   dm  stable .gris

## 2021-12-27 NOTE — MR
INDICATION:



Acute onset gait disturbance.



TECHNIQUE:



MRI brain: Multiplanar multisequence MR imaging acquired prior to and 

following intravenous contrast.



MRA head: Time-of-flight imaging acquired.



MRA neck: Time-of-flight and postcontrast imaging acquired.



COMPARISON:



CTA head and neck 12/24/2021.



FINDINGS:



MRI brain:



Motion artifact degrades postcontrast sequences.



Prominence of the ventricles and sulci compatible with mild-to-moderate 

diffuse cerebral volume loss. No mass effect or midline shift. Patchy T2 

FLAIR hyperintensities in the supratentorial white matter, typical for mild 

chronic microvascular ischemic changes.



No diffusion restriction to suggest acute infarction. No intracranial 

hemorrhage or pathologic extra-axial fluid collection. No pathologic 

intracranial enhancement within exam limitations. 



The major arterial flow voids of the skullbase are preserved. Thinning of 

the ocular lenses. Mild-to-moderate ethmoid and maxillary sinus mucosal 

thickening. Small bilateral mastoid effusions.



MRA head:



Artifact degrades image quality.



The visualized internal carotid, middle cerebral, and anterior cerebral 

arteries are widely patent.



The vertebral, basilar, and posterior cerebral arteries are widely patent. 

Fetal origin left posterior cerebral artery.



No intracranial aneurysm.



MRA neck:



Artifact degrades image quality.



The common carotid arteries are widely patent.



Mild (less than 50 percent) narrowing of the proximal right cervical 

internal carotid artery.



The left cervical internal carotid artery is widely patent.



The vertebral arteries are codominant and widely patent.



IMPRESSION:



1. No acute intracranial abnormality.



2. Mild chronic microvascular ischemic changes and mild-to-moderate diffuse 

cerebral volume loss.



3. Unremarkable MRA of the head.



4. Mild (less than 50 percent) narrowing of the proximal right cervical 

internal carotid artery.



Dictated by Dalton Samuels MD @ 12/27/2021 2:52:27 PM



(Electronically Signed)

## 2021-12-27 NOTE — US
INDICATION:



Gait disturbance 



TECHNIQUE:



The carotid circulations and the vertebral arteries in the neck were 

examined with gray-scale ultrasound, color-flow and Doppler spectral 

analysis. Degrees of stenosis were determined using SRU 2002 Consensus 

Panel Criteria.



COMPARISON:



None



FINDINGS:



Multiple sonographic images with grayscale, color Doppler and spectral 

Doppler analysis were obtained demonstrate visualized atherosclerotic 

plaque in the carotid arteries bilaterally. Velocities are within normal 

limits bilaterally. The right ICA/CCA ratio is 0.69 and the left ICA/CCA 

ratio is 0.92. Spectral waveforms are normal. Both vertebral arteries are 

antegrade. Minimal plaque left ICA. 



IMPRESSION:



1. Minimal plaque left ICA. 



2. Estimated stenosis in the right internal carotid artery is less than 50% 

by NASCET criteria.



3. Estimated stenosis in the left internal carotid artery is less than 50% 

by NASCET criteria.



Dictated by Chance Lackey MD @ 12/27/2021 8:13:23 PM



(Electronically Signed)

## 2021-12-28 LAB
BUN SERPL-MCNC: 15 MG/DL (ref 7–18)
CHLORIDE SERPL-SCNC: 104 MMOL/L (ref 98–107)
CO2 SERPL-SCNC: 28.4 MMOL/L (ref 21–32)
GLUCOSE SERPL-MCNC: 111 MG/DL (ref 74–106)
POTASSIUM SERPL-SCNC: 3.9 MMOL/L (ref 3.5–5.1)
SODIUM SERPL-SCNC: 141 MMOL/L (ref 136–148)

## 2021-12-28 RX ADMIN — DEXTROSE SCH UNITS: 10 SOLUTION INTRAVENOUS at 06:11

## 2021-12-28 RX ADMIN — DEXTROSE SCH: 10 SOLUTION INTRAVENOUS at 18:15

## 2021-12-28 NOTE — ECHO
EXAM DATE: 12/24/21



PATIENT'S AGE: 88





The ECHO report has been scanned into [employer] and can be seen in this patient's
EMR (Electronic Medical Record) under the REPORTS section. The report has also 
been scanned into PACS.



[place]

## 2021-12-29 LAB
BUN SERPL-MCNC: 15 MG/DL (ref 7–18)
CHLORIDE SERPL-SCNC: 106 MMOL/L (ref 98–107)
CO2 SERPL-SCNC: 28.3 MMOL/L (ref 21–32)
GLUCOSE SERPL-MCNC: 111 MG/DL (ref 74–106)
POTASSIUM SERPL-SCNC: 4 MMOL/L (ref 3.5–5.1)
SODIUM SERPL-SCNC: 144 MMOL/L (ref 136–148)

## 2021-12-29 RX ADMIN — ASPIRIN SCH MG: 325 TABLET, DELAYED RELEASE ORAL at 08:12

## 2021-12-29 RX ADMIN — DEXTROSE SCH UNITS: 10 SOLUTION INTRAVENOUS at 05:32

## 2021-12-29 RX ADMIN — DEXTROSE SCH UNITS: 10 SOLUTION INTRAVENOUS at 18:10

## 2021-12-29 NOTE — PCM.PN
- General Info


Date of Service: 12/29/21


Subjective Update: 


The patient is a 88-year-old  male, on day 6 of service, who has a 

significant past medical history of diabetes mellitus type 2 and hypertension, 

who was admitted to the medical floor due to ataxia without any abnormalities 

seen on imaging.  Upon interview with the patient today, he admits that he 

continues to feel weak but he feels a little stronger with daily physical 

therapy.  It was discussed with him that we will continue with PT in order to bu

ild up his strength so he could potentially go home in a few days; if his 

strength does not increase he may need to be placed within a nursing home.  Due 

to the patient's recent COVID-19 diagnosis, we would have to wait potentially 20

days before he could be placed if unable to return home.  This case has been 

discussed with social work/case management Representative Hussein, and we will 

continue to monitor this patient moving forward.  The patient is eating and 

drinking without any issues and has a healthy appetite.  He is in good spirits 

and denies any chest pain, palpitations, tingling, numbness, change in voice or 

vision, and headache.  He has no other health concerns at this time.








- Review of Systems


General: Reports: Weakness, Fatigue.  Denies: Fever


HEENT: Denies: Headaches, Sore Throat


Pulmonary: Denies: Shortness of Breath, Cough


Cardiovascular: Denies: Chest Pain, Palpitations


Gastrointestinal: Denies: Abdominal Pain


Genitourinary: Denies: Dysuria





- Patient Data


Vitals - Most Recent: 


                                Last Vital Signs











Temp  97.5 F   12/29/21 08:09


 


Pulse  98   12/29/21 08:09


 


Resp  16   12/29/21 08:09


 


BP  125/70   12/29/21 08:09


 


Pulse Ox  94 L  12/29/21 08:09











Weight - Most Recent: 202 lb 8 oz


I&O - Last 24 Hours: 


                                 Intake & Output











 12/28/21 12/29/21 12/29/21





 22:59 06:59 14:59


 


Intake Total 800 250 


 


Output Total 750  


 


Balance 50 250 











Lab Results Last 24 Hours: 


                         Laboratory Results - last 24 hr











  12/28/21 12/29/21 12/29/21 Range/Units





  17:34 06:22 06:38 


 


WBC    4.76  (4.0-11.0)  K/uL


 


RBC    4.48 L  (4.50-5.90)  M/uL


 


Hgb    14.2  (13.0-17.0)  g/dL


 


Hct    43.9  (38.0-50.0)  %


 


MCV    98.0  (80.0-98.0)  fL


 


MCH    31.7  (27.0-32.0)  pg


 


MCHC    32.3  (31.0-37.0)  g/dL


 


RDW Std Deviation    50.3  (28.0-62.0)  fl


 


RDW Coeff of Noah    14  (11.0-15.0)  %


 


Plt Count    188  (150-400)  K/uL


 


MPV    10.80  (7.40-12.00)  fL


 


Neut % (Auto)    66.4  (48.0-80.0)  %


 


Lymph % (Auto)    18.7  (16.0-40.0)  %


 


Mono % (Auto)    10.5  (0.0-15.0)  %


 


Eos % (Auto)    4.2  (0.0-7.0)  %


 


Baso % (Auto)    0.2  (0.0-1.5)  %


 


Neut # (Auto)    3.2  (1.4-5.7)  K/uL


 


Lymph # (Auto)    0.9  (0.6-2.4)  K/uL


 


Mono # (Auto)    0.5  (0.0-0.8)  K/uL


 


Eos # (Auto)    0.2  (0.0-0.7)  K/uL


 


Baso # (Auto)    0.0  (0.0-0.1)  K/uL


 


Nucleated RBC %    0.0  /100WBC


 


Nucleated RBCs #    0  K/uL


 


Sodium     (136-148)  mmol/L


 


Potassium     (3.5-5.1)  mmol/L


 


Chloride     ()  mmol/L


 


Carbon Dioxide     (21.0-32.0)  mmol/L


 


BUN     (7.0-18.0)  mg/dL


 


Creatinine     (0.8-1.3)  mg/dL


 


Est Cr Clr Drug Dosing     mL/min


 


Estimated GFR (MDRD)     ml/min


 


Glucose     ()  mg/dL


 


POC Glucose  152 H  111 H   (70-99)  mg/dL


 


Calcium     (8.5-10.1)  mg/dL














  12/29/21 Range/Units





  06:38 


 


WBC   (4.0-11.0)  K/uL


 


RBC   (4.50-5.90)  M/uL


 


Hgb   (13.0-17.0)  g/dL


 


Hct   (38.0-50.0)  %


 


MCV   (80.0-98.0)  fL


 


MCH   (27.0-32.0)  pg


 


MCHC   (31.0-37.0)  g/dL


 


RDW Std Deviation   (28.0-62.0)  fl


 


RDW Coeff of Noah   (11.0-15.0)  %


 


Plt Count   (150-400)  K/uL


 


MPV   (7.40-12.00)  fL


 


Neut % (Auto)   (48.0-80.0)  %


 


Lymph % (Auto)   (16.0-40.0)  %


 


Mono % (Auto)   (0.0-15.0)  %


 


Eos % (Auto)   (0.0-7.0)  %


 


Baso % (Auto)   (0.0-1.5)  %


 


Neut # (Auto)   (1.4-5.7)  K/uL


 


Lymph # (Auto)   (0.6-2.4)  K/uL


 


Mono # (Auto)   (0.0-0.8)  K/uL


 


Eos # (Auto)   (0.0-0.7)  K/uL


 


Baso # (Auto)   (0.0-0.1)  K/uL


 


Nucleated RBC %   /100WBC


 


Nucleated RBCs #   K/uL


 


Sodium  144  (136-148)  mmol/L


 


Potassium  4.0  (3.5-5.1)  mmol/L


 


Chloride  106  ()  mmol/L


 


Carbon Dioxide  28.3  (21.0-32.0)  mmol/L


 


BUN  15  (7.0-18.0)  mg/dL


 


Creatinine  1.2  (0.8-1.3)  mg/dL


 


Est Cr Clr Drug Dosing  46.70  mL/min


 


Estimated GFR (MDRD)  57.1  ml/min


 


Glucose  111 H  ()  mg/dL


 


POC Glucose   (70-99)  mg/dL


 


Calcium  8.3 L  (8.5-10.1)  mg/dL











Med Orders - Current: 


                               Current Medications





Allopurinol (Allopurinol 100 Mg Tab)  100 mg PO DAILY [facility]


   Last Admin: 12/29/21 08:12 Dose:  100 mg


   Documented by: 


Aspirin (Aspirin 325 Mg Tab.Ec)  325 mg PO DAILY [facility]


   Last Admin: 12/29/21 08:12 Dose:  325 mg


   Documented by: 


Atorvastatin Calcium (Atorvastatin 20 Mg Tab)  20 mg PO BEDTIME [facility]


   Last Admin: 12/28/21 21:04 Dose:  20 mg


   Documented by: 


Dextrose/Water (50% Dextrose In Water 50 Ml Syringe)  50 ml IVPUSH ASDIRECTED 

PRN


   PRN Reason: Hypoglycemia


Furosemide (Furosemide 40 Mg Tab)  40 mg PO DAILY [facility]


   Last Admin: 12/29/21 08:12 Dose:  40 mg


   Documented by: 


Glucagon (Glucagon,Human Recombinant 1 Mg Vial)  1 mg IM ASDIRECTED PRN


   PRN Reason: Hypoglycemia


Heparin Sodium (Porcine) (Heparin Sodium 5,000 Units/Ml Vial)  5,000 units 

SUBCUT Q12H [facility]


   Last Admin: 12/29/21 05:32 Dose:  5,000 units


   Documented by: 


Insulin Aspart (Insulin Aspart 100 Units/Ml 3 Ml Pen)  0 unit SUBCUT TIDAC [facility]; 

Protocol


   Last Admin: 12/29/21 08:11 Dose:  Not Given


   Documented by: 


Polyethylene Glycol (Polyethylene Glycol 3350 Powder 17 Gm Packet)  17 gm PO 

DAILY [facility]


   Last Admin: 12/29/21 08:12 Dose:  Not Given


   Documented by: 


Sodium Chloride (Sodium Chloride 0.9% 10 Ml Syringe)  10 ml FLUSH ASDIRECTED PRN


   PRN Reason: Keep Vein Open


   Last Admin: 12/24/21 19:34 Dose:  10 ml


   Documented by: 


Sodium Chloride (Sodium Chloride 0.9% 2.5 Ml Syringe)  2.5 ml FLUSH ASDIRECTED 

PRN


   PRN Reason: Keep Vein Open


   Last Admin: 12/24/21 19:34 Dose:  2.5 ml


   Documented by: 


Sodium Chloride (Sodium Chloride 0.9% 20 Ml Sdv)  10 ml IV ASDIRECTED PRN


   PRN Reason: IV Use





Discontinued Medications





Aspirin (Aspirin 81 Mg Tab.Chew)  324 mg PO ONETIME ONE


   Stop: 12/24/21 20:05


   Last Admin: 12/24/21 20:20 Dose:  324 mg


   Documented by: 


Aspirin (Aspirin 325 Mg Tab)  325 mg PO DAILY [facility]


   Last Admin: 12/28/21 08:44 Dose:  325 mg


   Documented by: 


Gadobenate Dimeglumine (Gadobenate Dimeglumine 529 Mg/Ml 20 Ml Sdv)  20 ml 

IVPUSH ONETIME STA


   Stop: 12/27/21 12:18


   Last Admin: 12/27/21 13:06 Dose:  19 ml


   Documented by: 


Sodium Chloride (Normal Saline)  1,000 mls @ 75 mls/hr IV ASDIRECTED [facility]


   Stop: 12/25/21 13:04


   Last Admin: 12/25/21 00:54 Dose:  75 mls/hr


   Documented by: 


Iopamidol (Iopamidol 755 Mg/Ml 500 Ml Multipack Bottle)  100 ml IVPUSH ONETIME 

ONE


   Stop: 12/24/21 19:03


   Last Admin: 12/25/21 07:04 Dose:  Not Given


   Documented by: 


Metformin HCl (Metformin 500 Mg Tab.Er)  500 mg PO BIDMEALS [facility]


   Last Admin: 12/25/21 09:42 Dose:  Not Given


   Documented by: 


Metformin HCl (Metformin 500 Mg Tab.Er)  500 mg PO BIDMEALS [facility]


   Last Admin: 12/25/21 10:39 Dose:  500 mg


   Documented by: 











- Exam


General: Alert, Oriented, Cooperative


HEENT: Mucous Membr. Moist/Pink


Neck: Trachea Midline


Lungs: Clear to Auscultation, Normal Respiratory Effort


Cardiovascular: Regular Rate, Regular Rhythm


GI/Abdominal Exam: Normal Bowel Sounds, Soft


Extremities: No Pedal Edema





- Patient Data


Lab Results Last 24 hrs: 


                         Laboratory Results - last 24 hr











  12/28/21 12/29/21 12/29/21 Range/Units





  17:34 06:22 06:38 


 


WBC    4.76  (4.0-11.0)  K/uL


 


RBC    4.48 L  (4.50-5.90)  M/uL


 


Hgb    14.2  (13.0-17.0)  g/dL


 


Hct    43.9  (38.0-50.0)  %


 


MCV    98.0  (80.0-98.0)  fL


 


MCH    31.7  (27.0-32.0)  pg


 


MCHC    32.3  (31.0-37.0)  g/dL


 


RDW Std Deviation    50.3  (28.0-62.0)  fl


 


RDW Coeff of Noah    14  (11.0-15.0)  %


 


Plt Count    188  (150-400)  K/uL


 


MPV    10.80  (7.40-12.00)  fL


 


Neut % (Auto)    66.4  (48.0-80.0)  %


 


Lymph % (Auto)    18.7  (16.0-40.0)  %


 


Mono % (Auto)    10.5  (0.0-15.0)  %


 


Eos % (Auto)    4.2  (0.0-7.0)  %


 


Baso % (Auto)    0.2  (0.0-1.5)  %


 


Neut # (Auto)    3.2  (1.4-5.7)  K/uL


 


Lymph # (Auto)    0.9  (0.6-2.4)  K/uL


 


Mono # (Auto)    0.5  (0.0-0.8)  K/uL


 


Eos # (Auto)    0.2  (0.0-0.7)  K/uL


 


Baso # (Auto)    0.0  (0.0-0.1)  K/uL


 


Nucleated RBC %    0.0  /100WBC


 


Nucleated RBCs #    0  K/uL


 


Sodium     (136-148)  mmol/L


 


Potassium     (3.5-5.1)  mmol/L


 


Chloride     ()  mmol/L


 


Carbon Dioxide     (21.0-32.0)  mmol/L


 


BUN     (7.0-18.0)  mg/dL


 


Creatinine     (0.8-1.3)  mg/dL


 


Est Cr Clr Drug Dosing     mL/min


 


Estimated GFR (MDRD)     ml/min


 


Glucose     ()  mg/dL


 


POC Glucose  152 H  111 H   (70-99)  mg/dL


 


Calcium     (8.5-10.1)  mg/dL














  12/29/21 Range/Units





  06:38 


 


WBC   (4.0-11.0)  K/uL


 


RBC   (4.50-5.90)  M/uL


 


Hgb   (13.0-17.0)  g/dL


 


Hct   (38.0-50.0)  %


 


MCV   (80.0-98.0)  fL


 


MCH   (27.0-32.0)  pg


 


MCHC   (31.0-37.0)  g/dL


 


RDW Std Deviation   (28.0-62.0)  fl


 


RDW Coeff of Noah   (11.0-15.0)  %


 


Plt Count   (150-400)  K/uL


 


MPV   (7.40-12.00)  fL


 


Neut % (Auto)   (48.0-80.0)  %


 


Lymph % (Auto)   (16.0-40.0)  %


 


Mono % (Auto)   (0.0-15.0)  %


 


Eos % (Auto)   (0.0-7.0)  %


 


Baso % (Auto)   (0.0-1.5)  %


 


Neut # (Auto)   (1.4-5.7)  K/uL


 


Lymph # (Auto)   (0.6-2.4)  K/uL


 


Mono # (Auto)   (0.0-0.8)  K/uL


 


Eos # (Auto)   (0.0-0.7)  K/uL


 


Baso # (Auto)   (0.0-0.1)  K/uL


 


Nucleated RBC %   /100WBC


 


Nucleated RBCs #   K/uL


 


Sodium  144  (136-148)  mmol/L


 


Potassium  4.0  (3.5-5.1)  mmol/L


 


Chloride  106  ()  mmol/L


 


Carbon Dioxide  28.3  (21.0-32.0)  mmol/L


 


BUN  15  (7.0-18.0)  mg/dL


 


Creatinine  1.2  (0.8-1.3)  mg/dL


 


Est Cr Clr Drug Dosing  46.70  mL/min


 


Estimated GFR (MDRD)  57.1  ml/min


 


Glucose  111 H  ()  mg/dL


 


POC Glucose   (70-99)  mg/dL


 


Calcium  8.3 L  (8.5-10.1)  mg/dL











Result Diagrams: 


                                 12/29/21 06:38





                                 12/29/21 06:38





Sepsis Event Note





- Evaluation


Sepsis Screening Result: No Definite Risk





- Focused Exam


Vital Signs: 


                                   Vital Signs











  Temp Pulse Resp BP Pulse Ox Pulse Ox


 


 12/29/21 08:09  97.5 F  98  16  125/70  94 L 


 


 12/29/21 06:00       96


 


 12/29/21 03:37  98.1 F  82  18  122/60  92 L 














- Problem List & Annotations


(1) Diabetes mellitus


SNOMED Code(s): 16002462


   Code(s): E11.9 - TYPE 2 DIABETES MELLITUS WITHOUT COMPLICATIONS   Status: 

Acute   Current Visit: Yes   





(2) Ambulatory dysfunction


SNOMED Code(s): 143700778


   Code(s): R26.2 - DIFFICULTY IN WALKING, NOT ELSEWHERE CLASSIFIED   Status: 

Acute   Priority: High   Current Visit: Yes   Onset Date: ~12/25/21   





(3) Ataxia following unspecified cerebrovascular disease


SNOMED Code(s): 97086740244600


   Code(s): I69.993 - ATAXIA FOLLOWING UNSPECIFIED CEREBROVASCULAR DISEASE   

Status: Acute   Priority: High   Current Visit: Yes   Onset Date: ~12/24/21   

Annotation/Comment:: Maintain consistent care for fall risk.  Observe patient.  

MRA studies of both head and neck are planned.  PT/OT/ST consults planned when 

first available. Transferring care by direct report to Dr. Evaristo Keller 20:00, 

12/25/2021.   





- Problem List Review


Problem List Initiated/Reviewed/Updated: Yes





- My Orders


Last 24 Hours: 


My Active Orders





12/30/21 05:11


BASIC METABOLIC PANEL,BMP [CHEM] AM 


CBC WITH AUTO DIFF [HEME] AM 





12/31/21 05:11


BASIC METABOLIC PANEL,BMP [CHEM] AM 


CBC WITH AUTO DIFF [HEME] AM 














- Assessment


Assessment:: 





1. Ambulatory dysfunction/ataxia secondary to unspecified cerebrovascular 

disease


-Continue with daily physical therapy in order to build up his 

strength/ambulation


-Patient may require nursing home placement if his strength does not improve to 

the point where he can perform his activities of daily living independently


-This case has been discussed with social work/case management and we will c

duncan to follow their recommendations moving forward


-Continue with atorvastatin home dosage 





2.  Diabetes


-Continue with NovoLog/Accu-Cheks/diabetic diet


-Daily BMP/CBC

## 2021-12-30 LAB
BUN SERPL-MCNC: 16 MG/DL (ref 7–18)
CHLORIDE SERPL-SCNC: 106 MMOL/L (ref 98–107)
CO2 SERPL-SCNC: 25.1 MMOL/L (ref 21–32)
GLUCOSE SERPL-MCNC: 112 MG/DL (ref 74–106)
POTASSIUM SERPL-SCNC: 3.7 MMOL/L (ref 3.5–5.1)
SODIUM SERPL-SCNC: 141 MMOL/L (ref 136–148)

## 2021-12-30 RX ADMIN — DEXTROSE SCH UNITS: 10 SOLUTION INTRAVENOUS at 17:31

## 2021-12-30 RX ADMIN — DEXTROSE SCH: 10 SOLUTION INTRAVENOUS at 05:57

## 2021-12-30 RX ADMIN — ASPIRIN SCH MG: 325 TABLET, DELAYED RELEASE ORAL at 08:31

## 2021-12-30 RX ADMIN — DEXTROSE SCH UNITS: 10 SOLUTION INTRAVENOUS at 06:38

## 2021-12-30 NOTE — PCM.PN
- General Info


Date of Service: 12/30/21


Subjective Update: 


The patient is a 88-year-old  male, on day 7 of service, who has a 

significant past medical history of diabetes mellitus type 2 and hypertension, 

who was admitted to the medical floor due to ataxia without any abnormalities 

seen on imaging.  Upon interview with the patient today, he was in good spirits 

and looks forward to going home.  Physical therapy saw this patient and says 

that he is back to his baseline.  The patient would like to work with physical 

therapy for 1 more day before going home and was concerned about needing help 

with some small activities when he is back at his residence.  I spoke to Hussein 

from case management/social work today who said that this patient would go home 

with home health agency.  This was iterated to the patient who was relieved.  He

has no health concerns at this time.








- Review of Systems


General: Reports: Weakness.  Denies: Fever, Fatigue


HEENT: Denies: Headaches, Sore Throat


Pulmonary: Denies: Shortness of Breath, Cough


Cardiovascular: Denies: Chest Pain, Palpitations


Gastrointestinal: Denies: Abdominal Pain


Genitourinary: Denies: Dysuria





- Patient Data


Vitals - Most Recent: 


                                Last Vital Signs











Temp  97.4 F   12/30/21 08:00


 


Pulse  94   12/30/21 08:00


 


Resp  16   12/30/21 08:00


 


BP  131/68   12/30/21 08:00


 


Pulse Ox  98   12/30/21 08:00











Weight - Most Recent: 202 lb 8 oz


I&O - Last 24 Hours: 


                                 Intake & Output











 12/29/21 12/30/21 12/30/21





 22:59 06:59 14:59


 


Intake Total 1650 500 


 


Balance 1650 500 











Lab Results Last 24 Hours: 


                         Laboratory Results - last 24 hr











  12/29/21 12/29/21 12/30/21 Range/Units





  12:35 17:10 06:23 


 


WBC    4.52  (4.0-11.0)  K/uL


 


RBC    4.01 L  (4.50-5.90)  M/uL


 


Hgb    12.9 L  (13.0-17.0)  g/dL


 


Hct    39.1  (38.0-50.0)  %


 


MCV    97.5  (80.0-98.0)  fL


 


MCH    32.2 H  (27.0-32.0)  pg


 


MCHC    33.0  (31.0-37.0)  g/dL


 


RDW Std Deviation    50.4  (28.0-62.0)  fl


 


RDW Coeff of Noah    14  (11.0-15.0)  %


 


Plt Count    147 L  (150-400)  K/uL


 


MPV    10.50  (7.40-12.00)  fL


 


Neut % (Auto)    60.0  (48.0-80.0)  %


 


Lymph % (Auto)    23.9  (16.0-40.0)  %


 


Mono % (Auto)    11.9  (0.0-15.0)  %


 


Eos % (Auto)    4.0  (0.0-7.0)  %


 


Baso % (Auto)    0.2  (0.0-1.5)  %


 


Neut # (Auto)    2.7  (1.4-5.7)  K/uL


 


Lymph # (Auto)    1.1  (0.6-2.4)  K/uL


 


Mono # (Auto)    0.5  (0.0-0.8)  K/uL


 


Eos # (Auto)    0.2  (0.0-0.7)  K/uL


 


Baso # (Auto)    0.0  (0.0-0.1)  K/uL


 


Nucleated RBC %    0.0  /100WBC


 


Nucleated RBCs #    0  K/uL


 


Sodium     (136-148)  mmol/L


 


Potassium     (3.5-5.1)  mmol/L


 


Chloride     ()  mmol/L


 


Carbon Dioxide     (21.0-32.0)  mmol/L


 


BUN     (7.0-18.0)  mg/dL


 


Creatinine     (0.8-1.3)  mg/dL


 


Est Cr Clr Drug Dosing     mL/min


 


Estimated GFR (MDRD)     ml/min


 


Glucose     ()  mg/dL


 


POC Glucose  84  107 H   (70-99)  mg/dL


 


Calcium     (8.5-10.1)  mg/dL














  12/30/21 12/30/21 Range/Units





  06:23 06:37 


 


WBC    (4.0-11.0)  K/uL


 


RBC    (4.50-5.90)  M/uL


 


Hgb    (13.0-17.0)  g/dL


 


Hct    (38.0-50.0)  %


 


MCV    (80.0-98.0)  fL


 


MCH    (27.0-32.0)  pg


 


MCHC    (31.0-37.0)  g/dL


 


RDW Std Deviation    (28.0-62.0)  fl


 


RDW Coeff of Noah    (11.0-15.0)  %


 


Plt Count    (150-400)  K/uL


 


MPV    (7.40-12.00)  fL


 


Neut % (Auto)    (48.0-80.0)  %


 


Lymph % (Auto)    (16.0-40.0)  %


 


Mono % (Auto)    (0.0-15.0)  %


 


Eos % (Auto)    (0.0-7.0)  %


 


Baso % (Auto)    (0.0-1.5)  %


 


Neut # (Auto)    (1.4-5.7)  K/uL


 


Lymph # (Auto)    (0.6-2.4)  K/uL


 


Mono # (Auto)    (0.0-0.8)  K/uL


 


Eos # (Auto)    (0.0-0.7)  K/uL


 


Baso # (Auto)    (0.0-0.1)  K/uL


 


Nucleated RBC %    /100WBC


 


Nucleated RBCs #    K/uL


 


Sodium  141   (136-148)  mmol/L


 


Potassium  3.7   (3.5-5.1)  mmol/L


 


Chloride  106   ()  mmol/L


 


Carbon Dioxide  25.1   (21.0-32.0)  mmol/L


 


BUN  16   (7.0-18.0)  mg/dL


 


Creatinine  1.2   (0.8-1.3)  mg/dL


 


Est Cr Clr Drug Dosing  46.70   mL/min


 


Estimated GFR (MDRD)  57.1   ml/min


 


Glucose  112 H   ()  mg/dL


 


POC Glucose   129 H  (70-99)  mg/dL


 


Calcium  8.4 L   (8.5-10.1)  mg/dL











Med Orders - Current: 


                               Current Medications





Allopurinol (Allopurinol 100 Mg Tab)  100 mg PO DAILY [facility]


   Last Admin: 12/30/21 08:32 Dose:  100 mg


   Documented by: 


Aspirin (Aspirin 325 Mg Tab.Ec)  325 mg PO DAILY [facility]


   Last Admin: 12/30/21 08:31 Dose:  325 mg


   Documented by: 


Atorvastatin Calcium (Atorvastatin 20 Mg Tab)  20 mg PO BEDTIME [facility]


   Last Admin: 12/29/21 20:42 Dose:  20 mg


   Documented by: 


Dextrose/Water (50% Dextrose In Water 50 Ml Syringe)  50 ml IVPUSH ASDIRECTED 

PRN


   PRN Reason: Hypoglycemia


Furosemide (Furosemide 40 Mg Tab)  40 mg PO DAILY [facility]


   Last Admin: 12/30/21 08:32 Dose:  40 mg


   Documented by: 


Glucagon (Glucagon,Human Recombinant 1 Mg Vial)  1 mg IM ASDIRECTED PRN


   PRN Reason: Hypoglycemia


Heparin Sodium (Porcine) (Heparin Sodium 5,000 Units/Ml Vial)  5,000 units 

SUBCUT Q12H [facility]


   Last Admin: 12/30/21 06:38 Dose:  5,000 units


   Documented by: 


Insulin Aspart (Insulin Aspart 100 Units/Ml 3 Ml Pen)  0 unit SUBCUT TIDAC [facility]; 

Protocol


   Last Admin: 12/30/21 07:56 Dose:  Not Given


   Documented by: 


Polyethylene Glycol (Polyethylene Glycol 3350 Powder 17 Gm Packet)  17 gm PO 

DAILY [facility]


   Last Admin: 12/30/21 08:32 Dose:  17 gm


   Documented by: 


Sodium Chloride (Sodium Chloride 0.9% 10 Ml Syringe)  10 ml FLUSH ASDIRECTED PRN


   PRN Reason: Keep Vein Open


   Last Admin: 12/24/21 19:34 Dose:  10 ml


   Documented by: 


Sodium Chloride (Sodium Chloride 0.9% 2.5 Ml Syringe)  2.5 ml FLUSH ASDIRECTED 

PRN


   PRN Reason: Keep Vein Open


   Last Admin: 12/24/21 19:34 Dose:  2.5 ml


   Documented by: 


Sodium Chloride (Sodium Chloride 0.9% 20 Ml Sdv)  10 ml IV ASDIRECTED PRN


   PRN Reason: IV Use





Discontinued Medications





Aspirin (Aspirin 81 Mg Tab.Chew)  324 mg PO ONETIME ONE


   Stop: 12/24/21 20:05


   Last Admin: 12/24/21 20:20 Dose:  324 mg


   Documented by: 


Aspirin (Aspirin 325 Mg Tab)  325 mg PO DAILY [facility]


   Last Admin: 12/28/21 08:44 Dose:  325 mg


   Documented by: 


Gadobenate Dimeglumine (Gadobenate Dimeglumine 529 Mg/Ml 20 Ml Sdv)  20 ml 

IVPUSH ONETIME STA


   Stop: 12/27/21 12:18


   Last Admin: 12/27/21 13:06 Dose:  19 ml


   Documented by: 


Sodium Chloride (Normal Saline)  1,000 mls @ 75 mls/hr IV ASDIRECTED RENEA


   Stop: 12/25/21 13:04


   Last Admin: 12/25/21 00:54 Dose:  75 mls/hr


   Documented by: 


Iopamidol (Iopamidol 755 Mg/Ml 500 Ml Multipack Bottle)  100 ml IVPUSH ONETIME 

ONE


   Stop: 12/24/21 19:03


   Last Admin: 12/25/21 07:04 Dose:  Not Given


   Documented by: 


Metformin HCl (Metformin 500 Mg Tab.Er)  500 mg PO BIDMEALS [facility]


   Last Admin: 12/25/21 09:42 Dose:  Not Given


   Documented by: 


Metformin HCl (Metformin 500 Mg Tab.Er)  500 mg PO BIDMEALS [facility]


   Last Admin: 12/25/21 10:39 Dose:  500 mg


   Documented by: 











- Exam


General: Alert, Oriented, Cooperative


HEENT: Mucous Membr. Moist/Pink


Neck: Trachea Midline


Lungs: Clear to Auscultation, Normal Respiratory Effort


Cardiovascular: Regular Rate, Regular Rhythm


GI/Abdominal Exam: Normal Bowel Sounds, Soft


Neurological: Normal Speech





- Patient Data


Lab Results Last 24 hrs: 


                         Laboratory Results - last 24 hr











  12/29/21 12/29/21 12/30/21 Range/Units





  12:35 17:10 06:23 


 


WBC    4.52  (4.0-11.0)  K/uL


 


RBC    4.01 L  (4.50-5.90)  M/uL


 


Hgb    12.9 L  (13.0-17.0)  g/dL


 


Hct    39.1  (38.0-50.0)  %


 


MCV    97.5  (80.0-98.0)  fL


 


MCH    32.2 H  (27.0-32.0)  pg


 


MCHC    33.0  (31.0-37.0)  g/dL


 


RDW Std Deviation    50.4  (28.0-62.0)  fl


 


RDW Coeff of Noah    14  (11.0-15.0)  %


 


Plt Count    147 L  (150-400)  K/uL


 


MPV    10.50  (7.40-12.00)  fL


 


Neut % (Auto)    60.0  (48.0-80.0)  %


 


Lymph % (Auto)    23.9  (16.0-40.0)  %


 


Mono % (Auto)    11.9  (0.0-15.0)  %


 


Eos % (Auto)    4.0  (0.0-7.0)  %


 


Baso % (Auto)    0.2  (0.0-1.5)  %


 


Neut # (Auto)    2.7  (1.4-5.7)  K/uL


 


Lymph # (Auto)    1.1  (0.6-2.4)  K/uL


 


Mono # (Auto)    0.5  (0.0-0.8)  K/uL


 


Eos # (Auto)    0.2  (0.0-0.7)  K/uL


 


Baso # (Auto)    0.0  (0.0-0.1)  K/uL


 


Nucleated RBC %    0.0  /100WBC


 


Nucleated RBCs #    0  K/uL


 


Sodium     (136-148)  mmol/L


 


Potassium     (3.5-5.1)  mmol/L


 


Chloride     ()  mmol/L


 


Carbon Dioxide     (21.0-32.0)  mmol/L


 


BUN     (7.0-18.0)  mg/dL


 


Creatinine     (0.8-1.3)  mg/dL


 


Est Cr Clr Drug Dosing     mL/min


 


Estimated GFR (MDRD)     ml/min


 


Glucose     ()  mg/dL


 


POC Glucose  84  107 H   (70-99)  mg/dL


 


Calcium     (8.5-10.1)  mg/dL














  12/30/21 12/30/21 Range/Units





  06:23 06:37 


 


WBC    (4.0-11.0)  K/uL


 


RBC    (4.50-5.90)  M/uL


 


Hgb    (13.0-17.0)  g/dL


 


Hct    (38.0-50.0)  %


 


MCV    (80.0-98.0)  fL


 


MCH    (27.0-32.0)  pg


 


MCHC    (31.0-37.0)  g/dL


 


RDW Std Deviation    (28.0-62.0)  fl


 


RDW Coeff of Noah    (11.0-15.0)  %


 


Plt Count    (150-400)  K/uL


 


MPV    (7.40-12.00)  fL


 


Neut % (Auto)    (48.0-80.0)  %


 


Lymph % (Auto)    (16.0-40.0)  %


 


Mono % (Auto)    (0.0-15.0)  %


 


Eos % (Auto)    (0.0-7.0)  %


 


Baso % (Auto)    (0.0-1.5)  %


 


Neut # (Auto)    (1.4-5.7)  K/uL


 


Lymph # (Auto)    (0.6-2.4)  K/uL


 


Mono # (Auto)    (0.0-0.8)  K/uL


 


Eos # (Auto)    (0.0-0.7)  K/uL


 


Baso # (Auto)    (0.0-0.1)  K/uL


 


Nucleated RBC %    /100WBC


 


Nucleated RBCs #    K/uL


 


Sodium  141   (136-148)  mmol/L


 


Potassium  3.7   (3.5-5.1)  mmol/L


 


Chloride  106   ()  mmol/L


 


Carbon Dioxide  25.1   (21.0-32.0)  mmol/L


 


BUN  16   (7.0-18.0)  mg/dL


 


Creatinine  1.2   (0.8-1.3)  mg/dL


 


Est Cr Clr Drug Dosing  46.70   mL/min


 


Estimated GFR (MDRD)  57.1   ml/min


 


Glucose  112 H   ()  mg/dL


 


POC Glucose   129 H  (70-99)  mg/dL


 


Calcium  8.4 L   (8.5-10.1)  mg/dL











Result Diagrams: 


                                 12/30/21 06:23





                                 12/30/21 06:23





Sepsis Event Note





- Evaluation


Sepsis Screening Result: No Definite Risk





- Focused Exam


Vital Signs: 


                                   Vital Signs











  Temp Pulse Resp BP Pulse Ox


 


 12/30/21 08:00  97.4 F  94  16  131/68  98


 


 12/30/21 04:00  97.6 F  84  18  108/68  92 L


 


 12/30/21 00:00  97.3 F  91  18  95/61  94 L














- Problem List & Annotations


(1) Diabetes mellitus


SNOMED Code(s): 80500187


   Code(s): E11.9 - TYPE 2 DIABETES MELLITUS WITHOUT COMPLICATIONS   Status: 

Acute   Current Visit: Yes   





(2) Ambulatory dysfunction


SNOMED Code(s): 502169500


   Code(s): R26.2 - DIFFICULTY IN WALKING, NOT ELSEWHERE CLASSIFIED   Status: 

Acute   Priority: High   Current Visit: Yes   Onset Date: ~12/25/21   





(3) Ataxia following unspecified cerebrovascular disease


SNOMED Code(s): 55156406086332


   Code(s): I69.993 - ATAXIA FOLLOWING UNSPECIFIED CEREBROVASCULAR DISEASE   

Status: Acute   Priority: High   Current Visit: Yes   Onset Date: ~12/24/21   

Annotation/Comment:: Maintain consistent care for fall risk.  Observe patient.  

MRA studies of both head and neck are planned.  PT/OT/ST consults planned when 

first available. Transferring care by direct report to Dr. Evaristo Keller 20:00, 

12/25/2021.   





- Problem List Review


Problem List Initiated/Reviewed/Updated: Yes





- My Orders


Last 24 Hours: 


My Active Orders





12/31/21 05:11


BASIC METABOLIC PANEL,BMP [CHEM] AM 


CBC WITH AUTO DIFF [HEME] AM 














- Assessment


Assessment:: 





1. Ambulatory dysfunction/ataxia secondary to unspecified cerebrovascular 

disease


-Continue with daily physical therapy in order to build up his 

strength/ambulation, PT has cleared the patient says that he is back to his 

baseline, he will most likely be discharged tomorrow with home health agency in 

place in order to help him with activities of daily living around his residence.

 This case has been discussed with social work/case management.


-Continue with atorvastatin home dosage 





2.  Diabetes


-Continue with NovoLog/Accu-Cheks/diabetic diet


-Daily BMP/CBC

## 2021-12-31 LAB
BUN SERPL-MCNC: 17 MG/DL (ref 7–18)
CHLORIDE SERPL-SCNC: 105 MMOL/L (ref 98–107)
CO2 SERPL-SCNC: 27.1 MMOL/L (ref 21–32)
GLUCOSE SERPL-MCNC: 106 MG/DL (ref 74–106)
POTASSIUM SERPL-SCNC: 3.8 MMOL/L (ref 3.5–5.1)
SODIUM SERPL-SCNC: 141 MMOL/L (ref 136–148)

## 2021-12-31 RX ADMIN — DEXTROSE SCH UNITS: 10 SOLUTION INTRAVENOUS at 06:30

## 2021-12-31 RX ADMIN — ASPIRIN SCH MG: 325 TABLET, DELAYED RELEASE ORAL at 08:42

## 2021-12-31 NOTE — PCM.DCSUM1
**Discharge Summary





- Hospital Course


Free Text/Narrative:: 


The patient is a 88-year-old  male, on day 8 of service, who has a 

significant past medical history of diabetes mellitus type 2 and hypertension, 

who was admitted to the medical floor due to strokelike symptoms.  During the 

patient's hospitalization, he was first worked up for stroke due to left lower 

extremity weakness but all neuroimaging was negative for abnormalities or any 

cerebrovascular insults.  It was deciphered that this patient was suffering from

ambulatory dysfunction/weakness/ataxia.  As a result physical therapy worked 

with this patient on a continual basis in order to build up his strength so he 

could be independent with respect to his activities of daily living.  





Gradually the patient's strength improved with skilled PT and he is now back to 

his baseline.  Nonetheless, this patient requested home health to help him when 

he does return home.  This is something that will be set up by the hospital.  

Upon interview with the patient this morning, he was enthusiastic about going 

home and feels he is ready to perform activities of daily life with limited 

help.  His physical exam was unremarkable and physical therapy goals have been 

met over the past 24 hours.  He is now stable and can be discharged home safely.

 He has been advised to return to the hospital if he has any increasing 

shortness of breath or respiratory difficulty, chest pain, palpitations, slurred

speech, change in voice or vision, and tingling or numbness around his body.  He

has also been educated on the importance of being compliant with his medication 

and taking it at scheduled times.  Lastly, he has been counseled for follow-up 

with his primary care provider in 1 to 2 weeks time.








- Discharge Data


Discharge Date: 12/31/21


Discharge Disposition: Home, W Home Health Agency 06


Condition: Fair





- Referral to Home Health


Date of Face to Face Encounter: 12/31/21


Reason for Homebound Status: Physical debility and deconditioning


Primary Care Physician: 


PCP None





Skilled Need: Help with activities of daily living





- Discharge Diagnosis/Problem(s)


(1) Diabetes mellitus


SNOMED Code(s): 77347396


   ICD Code: E11.9 - TYPE 2 DIABETES MELLITUS WITHOUT COMPLICATIONS   Status: 

Acute   Current Visit: Yes   





(2) Ambulatory dysfunction


SNOMED Code(s): 814840816


   ICD Code: R26.2 - DIFFICULTY IN WALKING, NOT ELSEWHERE CLASSIFIED   Status: 

Acute   Priority: High   Current Visit: Yes   Onset Date: ~12/25/21   





(3) Ataxia following unspecified cerebrovascular disease


SNOMED Code(s): 82538208805035


   ICD Code: I69.993 - ATAXIA FOLLOWING UNSPECIFIED CEREBROVASCULAR DISEASE   

Status: Acute   Priority: High   Current Visit: Yes   Onset Date: ~12/24/21   

Problem Details: Maintain consistent care for fall risk.  Observe patient.  MRA 

studies of both head and neck are planned.  PT/OT/ST consults planned when first

available. Transferring care by direct report to Dr. Evaristo Keller 20:00, 

12/25/2021.   





- Patient Summary/Data


Consults: 


                                  Consultations





12/25/21


Consult to Speech Language Pathology [SLP Evaluation and Treatment] [CONS] 

Routine 


PT Evaluation and Treatment [CONS] Routine 














- Patient Instructions


Diet: Regular Diet as Tolerated


Activity: As Tolerated


Showering/Bathing: May Shower


Other/Special Instructions: -Return to the hospital if you have chest pain, 

palpitations, slurred speech, change in voice or vision, tingling or numbness.  

-Take your medication at scheduled times.  -Follow-up with your PCP





- Discharge Plan


Home Medications: 


                                    Home Meds





Allopurinol [Zyloprim] 100 mg PO ONETIME 10/22/17 [History]


Furosemide 40 mg PO DAILY 10/22/17 [History]


atorvaSTATin [Lipitor] 20 mg PO BEDTIME 10/22/17 [History]


metFORMIN [Glucophage XR] 500 mg PO DAILY 10/22/17 [History]








Patient Handouts:  COVID-19 Frequently Asked Questions, COVID-19 Vaccine 

Information, Ataxia, COVID-19: How to Protect Yourself and Others - [facility]


Referrals: 


Elizabeth Gramajo PA [Physician Assistant] - 01/13/22 10:00 am





- Discharge Summary/Plan Comment


DC Time >30 min.: Yes


Total # of Minutes for Discharge Time: 


35 minutes








- Review of Systems


General: Denies: Weakness, Fatigue


HEENT: Denies: Headaches, Sore Throat


Pulmonary: Denies: Shortness of Breath, Cough


Cardiovascular: Denies: Chest Pain, Palpitations


Gastrointestinal: Denies: Abdominal Pain


Genitourinary: Denies: Dysuria, Frequency


Neurological: Denies: Numbness, Trouble Speaking





- Patient Data


Vitals - Most Recent: 


                                Last Vital Signs











Temp  98.6 F   12/31/21 08:40


 


Pulse  79   12/31/21 08:40


 


Resp  15   12/31/21 08:40


 


BP  123/66   12/31/21 08:40


 


Pulse Ox  93 L  12/31/21 08:40











Weight - Most Recent: 202 lb 8 oz


I&O - Last 24 hours: 


                                 Intake & Output











 12/30/21 12/31/21 12/31/21





 22:59 06:59 14:59


 


Intake Total 800 300 


 


Output Total 750  


 


Balance 50 300 











Lab Results - Last 24 hrs: 


                         Laboratory Results - last 24 hr











  12/30/21 12/31/21 12/31/21 Range/Units





  16:24 05:22 05:22 


 


WBC   5.95   (4.0-11.0)  K/uL


 


RBC   4.38 L   (4.50-5.90)  M/uL


 


Hgb   13.9   (13.0-17.0)  g/dL


 


Hct   42.7   (38.0-50.0)  %


 


MCV   97.5   (80.0-98.0)  fL


 


MCH   31.7   (27.0-32.0)  pg


 


MCHC   32.6   (31.0-37.0)  g/dL


 


RDW Std Deviation   50.9   (28.0-62.0)  fl


 


RDW Coeff of Noah   14   (11.0-15.0)  %


 


Plt Count   191   (150-400)  K/uL


 


MPV   11.30   (7.40-12.00)  fL


 


Neut % (Auto)   63.2   (48.0-80.0)  %


 


Lymph % (Auto)   21.8   (16.0-40.0)  %


 


Mono % (Auto)   11.1   (0.0-15.0)  %


 


Eos % (Auto)   3.7   (0.0-7.0)  %


 


Baso % (Auto)   0.2   (0.0-1.5)  %


 


Neut # (Auto)   3.8   (1.4-5.7)  K/uL


 


Lymph # (Auto)   1.3   (0.6-2.4)  K/uL


 


Mono # (Auto)   0.7   (0.0-0.8)  K/uL


 


Eos # (Auto)   0.2   (0.0-0.7)  K/uL


 


Baso # (Auto)   0.0   (0.0-0.1)  K/uL


 


Nucleated RBC %   0.0   /100WBC


 


Nucleated RBCs #   0   K/uL


 


Sodium    141  (136-148)  mmol/L


 


Potassium    3.8  (3.5-5.1)  mmol/L


 


Chloride    105  ()  mmol/L


 


Carbon Dioxide    27.1  (21.0-32.0)  mmol/L


 


BUN    17  (7.0-18.0)  mg/dL


 


Creatinine    1.2  (0.8-1.3)  mg/dL


 


Est Cr Clr Drug Dosing    46.70  mL/min


 


Estimated GFR (MDRD)    57.1  ml/min


 


Glucose    106  ()  mg/dL


 


POC Glucose  142 H    (70-99)  mg/dL


 


Calcium    8.4 L  (8.5-10.1)  mg/dL














  12/31/21 Range/Units





  06:26 


 


WBC   (4.0-11.0)  K/uL


 


RBC   (4.50-5.90)  M/uL


 


Hgb   (13.0-17.0)  g/dL


 


Hct   (38.0-50.0)  %


 


MCV   (80.0-98.0)  fL


 


MCH   (27.0-32.0)  pg


 


MCHC   (31.0-37.0)  g/dL


 


RDW Std Deviation   (28.0-62.0)  fl


 


RDW Coeff of Noah   (11.0-15.0)  %


 


Plt Count   (150-400)  K/uL


 


MPV   (7.40-12.00)  fL


 


Neut % (Auto)   (48.0-80.0)  %


 


Lymph % (Auto)   (16.0-40.0)  %


 


Mono % (Auto)   (0.0-15.0)  %


 


Eos % (Auto)   (0.0-7.0)  %


 


Baso % (Auto)   (0.0-1.5)  %


 


Neut # (Auto)   (1.4-5.7)  K/uL


 


Lymph # (Auto)   (0.6-2.4)  K/uL


 


Mono # (Auto)   (0.0-0.8)  K/uL


 


Eos # (Auto)   (0.0-0.7)  K/uL


 


Baso # (Auto)   (0.0-0.1)  K/uL


 


Nucleated RBC %   /100WBC


 


Nucleated RBCs #   K/uL


 


Sodium   (136-148)  mmol/L


 


Potassium   (3.5-5.1)  mmol/L


 


Chloride   ()  mmol/L


 


Carbon Dioxide   (21.0-32.0)  mmol/L


 


BUN   (7.0-18.0)  mg/dL


 


Creatinine   (0.8-1.3)  mg/dL


 


Est Cr Clr Drug Dosing   mL/min


 


Estimated GFR (MDRD)   ml/min


 


Glucose   ()  mg/dL


 


POC Glucose  113 H  (70-99)  mg/dL


 


Calcium   (8.5-10.1)  mg/dL











Med Orders - Current: 


                               Current Medications





Allopurinol (Allopurinol 100 Mg Tab)  100 mg PO DAILY [facility]


   Last Admin: 12/31/21 08:42 Dose:  100 mg


   Documented by: 


Aspirin (Aspirin 325 Mg Tab.Ec)  325 mg PO DAILY [facility]


   Last Admin: 12/31/21 08:42 Dose:  325 mg


   Documented by: 


Atorvastatin Calcium (Atorvastatin 20 Mg Tab)  20 mg PO BEDTIME [facility]


   Last Admin: 12/30/21 20:55 Dose:  20 mg


   Documented by: 


Dextrose/Water (50% Dextrose In Water 50 Ml Syringe)  50 ml IVPUSH ASDIRECTED 

PRN


   PRN Reason: Hypoglycemia


Furosemide (Furosemide 40 Mg Tab)  40 mg PO DAILY [facility]


   Last Admin: 12/31/21 08:42 Dose:  40 mg


   Documented by: 


Glucagon (Glucagon,Human Recombinant 1 Mg Vial)  1 mg IM ASDIRECTED PRN


   PRN Reason: Hypoglycemia


Heparin Sodium (Porcine) (Heparin Sodium 5,000 Units/Ml Vial)  5,000 units 

SUBCUT Q12H [facility]


   Last Admin: 12/31/21 06:30 Dose:  5,000 units


   Documented by: 


Insulin Aspart (Insulin Aspart 100 Units/Ml 3 Ml Pen)  0 unit SUBCUT TIDAC [facility]; 

Protocol


   Last Admin: 12/31/21 08:42 Dose:  Not Given


   Documented by: 


Polyethylene Glycol (Polyethylene Glycol 3350 Powder 17 Gm Packet)  17 gm PO 

DAILY [facility]


   Last Admin: 12/31/21 08:42 Dose:  17 gm


   Documented by: 


Sodium Chloride (Sodium Chloride 0.9% 10 Ml Syringe)  10 ml FLUSH ASDIRECTED PRN


   PRN Reason: Keep Vein Open


   Last Admin: 12/24/21 19:34 Dose:  10 ml


   Documented by: 


Sodium Chloride (Sodium Chloride 0.9% 2.5 Ml Syringe)  2.5 ml FLUSH ASDIRECTED 

PRN


   PRN Reason: Keep Vein Open


   Last Admin: 12/24/21 19:34 Dose:  2.5 ml


   Documented by: 


Sodium Chloride (Sodium Chloride 0.9% 20 Ml Sdv)  10 ml IV ASDIRECTED PRN


   PRN Reason: IV Use





Discontinued Medications





Aspirin (Aspirin 81 Mg Tab.Chew)  324 mg PO ONETIME ONE


   Stop: 12/24/21 20:05


   Last Admin: 12/24/21 20:20 Dose:  324 mg


   Documented by: 


Aspirin (Aspirin 325 Mg Tab)  325 mg PO DAILY [facility]


   Last Admin: 12/28/21 08:44 Dose:  325 mg


   Documented by: 


Gadobenate Dimeglumine (Gadobenate Dimeglumine 529 Mg/Ml 20 Ml Sdv)  20 ml 

IVPUSH ONETIME STA


   Stop: 12/27/21 12:18


   Last Admin: 12/27/21 13:06 Dose:  19 ml


   Documented by: 


Sodium Chloride (Normal Saline)  1,000 mls @ 75 mls/hr IV ASDIRECTED [facility]


   Stop: 12/25/21 13:04


   Last Admin: 12/25/21 00:54 Dose:  75 mls/hr


   Documented by: 


Iopamidol (Iopamidol 755 Mg/Ml 500 Ml Multipack Bottle)  100 ml IVPUSH ONETIME 

ONE


   Stop: 12/24/21 19:03


   Last Admin: 12/25/21 07:04 Dose:  Not Given


   Documented by: 


Metformin HCl (Metformin 500 Mg Tab.Er)  500 mg PO BIDMEALS [facility]


   Last Admin: 12/25/21 09:42 Dose:  Not Given


   Documented by: 


Metformin HCl (Metformin 500 Mg Tab.Er)  500 mg PO BIDMEALS [facility]


   Last Admin: 12/25/21 10:39 Dose:  500 mg


   Documented by: 











- Exam


General: Reports: Alert, Oriented, Cooperative


HEENT: Reports: Mucous Membr. Moist/Pink


Neck: Reports: Trachea Midline


Lungs: Reports: Clear to Auscultation, Normal Respiratory Effort


Cardiovascular: Reports: Regular Rate, Regular Rhythm, No Murmurs


GI/Abdominal Exam: Normal Bowel Sounds, Soft, Non-Tender


Extremities: No Pedal Edema

## 2022-12-14 NOTE — PCM.PN
- General Info


Date of Service: 12/28/21


Admission Dx/Problem (Free Text): 


The patient is a 88-year-old  male, on day 5 upon interview with the 

patient today, of service, who has a significant past medical history of 

diabetes mellitus type 2 and hypertension, who was admitted to the medical floor

due to ataxia without any insults seen on imaging.  He admits that he feels weak

but he has made drastic improvements since first being admitted.  Physical 

therapy worked with this patient yesterday and feels that he would benefit from 

skilled PT moving forward.  The goal is for the patient to ambulate greater than

200 feet independently.  Speech therapy also worked with this patient yesterday 

and deciphered that he has no difficulties with eating and drinking.  The 

patient does have partial dentures which are not interfering with his 

swallowing.  We will continue to monitor this patient for improvements and send 

him home once he is strong enough to be independent with respect to his 

activities of daily living.  He has no other health concerns at this time.








- Review of Systems


General: Reports: Weakness.  Denies: Fever, Fatigue


HEENT: Denies: Headaches, Sore Throat


Pulmonary: Denies: Shortness of Breath, Cough


Cardiovascular: Denies: Chest Pain, Palpitations


Gastrointestinal: Denies: Abdominal Pain


Genitourinary: Denies: Dysuria





- Patient Data


Vitals - Most Recent: 


                                Last Vital Signs











Temp  98.3 F   12/28/21 04:48


 


Pulse  85   12/28/21 04:48


 


Resp  21 H  12/28/21 04:48


 


BP  104/64   12/28/21 04:48


 


Pulse Ox  93 L  12/28/21 06:00











Weight - Most Recent: 202 lb 8 oz


I&O - Last 24 Hours: 


                                 Intake & Output











 12/27/21 12/28/21 12/28/21





 22:59 06:59 14:59


 


Intake Total 800 400 


 


Output Total 1000  


 


Balance -200 400 











Lab Results Last 24 Hours: 


                         Laboratory Results - last 24 hr











  12/27/21 12/27/21 12/28/21 Range/Units





  11:22 16:35 06:43 


 


WBC    5.56  (4.0-11.0)  K/uL


 


RBC    4.64  (4.50-5.90)  M/uL


 


Hgb    14.9  (13.0-17.0)  g/dL


 


Hct    45.5  (38.0-50.0)  %


 


MCV    98.1 H  (80.0-98.0)  fL


 


MCH    32.1 H  (27.0-32.0)  pg


 


MCHC    32.7  (31.0-37.0)  g/dL


 


RDW Std Deviation    50.8  (28.0-62.0)  fl


 


RDW Coeff of Noah    14  (11.0-15.0)  %


 


Plt Count    196  (150-400)  K/uL


 


MPV    11.30  (7.40-12.00)  fL


 


Neut % (Auto)    64.9  (48.0-80.0)  %


 


Lymph % (Auto)    20.3  (16.0-40.0)  %


 


Mono % (Auto)    8.1  (0.0-15.0)  %


 


Eos % (Auto)    6.3  (0.0-7.0)  %


 


Baso % (Auto)    0.4  (0.0-1.5)  %


 


Neut # (Auto)    3.6  (1.4-5.7)  K/uL


 


Lymph # (Auto)    1.1  (0.6-2.4)  K/uL


 


Mono # (Auto)    0.5  (0.0-0.8)  K/uL


 


Eos # (Auto)    0.4  (0.0-0.7)  K/uL


 


Baso # (Auto)    0.0  (0.0-0.1)  K/uL


 


Nucleated RBC %    0.0  /100WBC


 


Nucleated RBCs #    0  K/uL


 


Sodium     (136-148)  mmol/L


 


Potassium     (3.5-5.1)  mmol/L


 


Chloride     ()  mmol/L


 


Carbon Dioxide     (21.0-32.0)  mmol/L


 


BUN     (7.0-18.0)  mg/dL


 


Creatinine     (0.8-1.3)  mg/dL


 


Est Cr Clr Drug Dosing     mL/min


 


Estimated GFR (MDRD)     ml/min


 


Glucose     ()  mg/dL


 


POC Glucose  160 H  148 H   (70-99)  mg/dL


 


Calcium     (8.5-10.1)  mg/dL














  12/28/21 12/28/21 Range/Units





  06:43 06:45 


 


WBC    (4.0-11.0)  K/uL


 


RBC    (4.50-5.90)  M/uL


 


Hgb    (13.0-17.0)  g/dL


 


Hct    (38.0-50.0)  %


 


MCV    (80.0-98.0)  fL


 


MCH    (27.0-32.0)  pg


 


MCHC    (31.0-37.0)  g/dL


 


RDW Std Deviation    (28.0-62.0)  fl


 


RDW Coeff of Noah    (11.0-15.0)  %


 


Plt Count    (150-400)  K/uL


 


MPV    (7.40-12.00)  fL


 


Neut % (Auto)    (48.0-80.0)  %


 


Lymph % (Auto)    (16.0-40.0)  %


 


Mono % (Auto)    (0.0-15.0)  %


 


Eos % (Auto)    (0.0-7.0)  %


 


Baso % (Auto)    (0.0-1.5)  %


 


Neut # (Auto)    (1.4-5.7)  K/uL


 


Lymph # (Auto)    (0.6-2.4)  K/uL


 


Mono # (Auto)    (0.0-0.8)  K/uL


 


Eos # (Auto)    (0.0-0.7)  K/uL


 


Baso # (Auto)    (0.0-0.1)  K/uL


 


Nucleated RBC %    /100WBC


 


Nucleated RBCs #    K/uL


 


Sodium  141   (136-148)  mmol/L


 


Potassium  3.9   (3.5-5.1)  mmol/L


 


Chloride  104   ()  mmol/L


 


Carbon Dioxide  28.4   (21.0-32.0)  mmol/L


 


BUN  15   (7.0-18.0)  mg/dL


 


Creatinine  1.2   (0.8-1.3)  mg/dL


 


Est Cr Clr Drug Dosing  46.70   mL/min


 


Estimated GFR (MDRD)  57.1   ml/min


 


Glucose  111 H   ()  mg/dL


 


POC Glucose   97  (70-99)  mg/dL


 


Calcium  9.1   (8.5-10.1)  mg/dL











Med Orders - Current: 


                               Current Medications





Allopurinol (Allopurinol 100 Mg Tab)  100 mg PO DAILY [facility]


   Last Admin: 12/28/21 08:44 Dose:  100 mg


   Documented by: 


Aspirin (Aspirin 325 Mg Tab.Ec)  325 mg PO DAILY [facility]


Atorvastatin Calcium (Atorvastatin 20 Mg Tab)  20 mg PO BEDTIME [facility]


   Last Admin: 12/27/21 21:30 Dose:  20 mg


   Documented by: 


Dextrose/Water (50% Dextrose In Water 50 Ml Syringe)  50 ml IVPUSH ASDIRECTED 

PRN


   PRN Reason: Hypoglycemia


Furosemide (Furosemide 40 Mg Tab)  40 mg PO DAILY [facility]


   Last Admin: 12/28/21 08:44 Dose:  40 mg


   Documented by: 


Glucagon (Glucagon,Human Recombinant 1 Mg Vial)  1 mg IM ASDIRECTED PRN


   PRN Reason: Hypoglycemia


Heparin Sodium (Porcine) (Heparin Sodium 5,000 Units/Ml Vial)  5,000 units 

SUBCUT Q12H [facility]


   Last Admin: 12/28/21 06:11 Dose:  5,000 units


   Documented by: 


Insulin Aspart (Insulin Aspart 100 Units/Ml 3 Ml Pen)  0 unit SUBCUT TIDAC [facility]; 

Protocol


   Last Admin: 12/28/21 08:44 Dose:  Not Given


   Documented by: 


Polyethylene Glycol (Polyethylene Glycol 3350 Powder 17 Gm Packet)  17 gm PO 

DAILY [facility]


   Last Admin: 12/28/21 08:45 Dose:  Not Given


   Documented by: 


Sodium Chloride (Sodium Chloride 0.9% 10 Ml Syringe)  10 ml FLUSH ASDIRECTED PRN


   PRN Reason: Keep Vein Open


   Last Admin: 12/24/21 19:34 Dose:  10 ml


   Documented by: 


Sodium Chloride (Sodium Chloride 0.9% 2.5 Ml Syringe)  2.5 ml FLUSH ASDIRECTED 

PRN


   PRN Reason: Keep Vein Open


   Last Admin: 12/24/21 19:34 Dose:  2.5 ml


   Documented by: 


Sodium Chloride (Sodium Chloride 0.9% 20 Ml Sdv)  10 ml IV ASDIRECTED PRN


   PRN Reason: IV Use





Discontinued Medications





Aspirin (Aspirin 81 Mg Tab.Chew)  324 mg PO ONETIME ONE


   Stop: 12/24/21 20:05


   Last Admin: 12/24/21 20:20 Dose:  324 mg


   Documented by: 


Aspirin (Aspirin 325 Mg Tab)  325 mg PO DAILY [facility]


   Last Admin: 12/28/21 08:44 Dose:  325 mg


   Documented by: 


Gadobenate Dimeglumine (Gadobenate Dimeglumine 529 Mg/Ml 20 Ml Sdv)  20 ml 

IVPUSH ONETIME STA


   Stop: 12/27/21 12:18


   Last Admin: 12/27/21 13:06 Dose:  19 ml


   Documented by: 


Sodium Chloride (Normal Saline)  1,000 mls @ 75 mls/hr IV ASDIRECTED RENEA


   Stop: 12/25/21 13:04


   Last Admin: 12/25/21 00:54 Dose:  75 mls/hr


   Documented by: 


Iopamidol (Iopamidol 755 Mg/Ml 500 Ml Multipack Bottle)  100 ml IVPUSH ONETIME 

ONE


   Stop: 12/24/21 19:03


   Last Admin: 12/25/21 07:04 Dose:  Not Given


   Documented by: 


Metformin HCl (Metformin 500 Mg Tab.Er)  500 mg PO BIDMEALS [facility]


   Last Admin: 12/25/21 09:42 Dose:  Not Given


   Documented by: 


Metformin HCl (Metformin 500 Mg Tab.Er)  500 mg PO BIDMEALS [facility]


   Last Admin: 12/25/21 10:39 Dose:  500 mg


   Documented by: 











- Exam


General: Alert, Oriented, Cooperative


HEENT: Mucous Membr. Moist/Pink


Neck: Trachea Midline


Lungs: Clear to Auscultation, Normal Respiratory Effort


Cardiovascular: Regular Rate, Regular Rhythm


GI/Abdominal Exam: Normal Bowel Sounds, Soft, Non-Tender


Neurological: Normal Speech





- Patient Data


Lab Results Last 24 hrs: 


                         Laboratory Results - last 24 hr











  12/27/21 12/27/21 12/28/21 Range/Units





  11:22 16:35 06:43 


 


WBC    5.56  (4.0-11.0)  K/uL


 


RBC    4.64  (4.50-5.90)  M/uL


 


Hgb    14.9  (13.0-17.0)  g/dL


 


Hct    45.5  (38.0-50.0)  %


 


MCV    98.1 H  (80.0-98.0)  fL


 


MCH    32.1 H  (27.0-32.0)  pg


 


MCHC    32.7  (31.0-37.0)  g/dL


 


RDW Std Deviation    50.8  (28.0-62.0)  fl


 


RDW Coeff of Noah    14  (11.0-15.0)  %


 


Plt Count    196  (150-400)  K/uL


 


MPV    11.30  (7.40-12.00)  fL


 


Neut % (Auto)    64.9  (48.0-80.0)  %


 


Lymph % (Auto)    20.3  (16.0-40.0)  %


 


Mono % (Auto)    8.1  (0.0-15.0)  %


 


Eos % (Auto)    6.3  (0.0-7.0)  %


 


Baso % (Auto)    0.4  (0.0-1.5)  %


 


Neut # (Auto)    3.6  (1.4-5.7)  K/uL


 


Lymph # (Auto)    1.1  (0.6-2.4)  K/uL


 


Mono # (Auto)    0.5  (0.0-0.8)  K/uL


 


Eos # (Auto)    0.4  (0.0-0.7)  K/uL


 


Baso # (Auto)    0.0  (0.0-0.1)  K/uL


 


Nucleated RBC %    0.0  /100WBC


 


Nucleated RBCs #    0  K/uL


 


Sodium     (136-148)  mmol/L


 


Potassium     (3.5-5.1)  mmol/L


 


Chloride     ()  mmol/L


 


Carbon Dioxide     (21.0-32.0)  mmol/L


 


BUN     (7.0-18.0)  mg/dL


 


Creatinine     (0.8-1.3)  mg/dL


 


Est Cr Clr Drug Dosing     mL/min


 


Estimated GFR (MDRD)     ml/min


 


Glucose     ()  mg/dL


 


POC Glucose  160 H  148 H   (70-99)  mg/dL


 


Calcium     (8.5-10.1)  mg/dL














  12/28/21 12/28/21 Range/Units





  06:43 06:45 


 


WBC    (4.0-11.0)  K/uL


 


RBC    (4.50-5.90)  M/uL


 


Hgb    (13.0-17.0)  g/dL


 


Hct    (38.0-50.0)  %


 


MCV    (80.0-98.0)  fL


 


MCH    (27.0-32.0)  pg


 


MCHC    (31.0-37.0)  g/dL


 


RDW Std Deviation    (28.0-62.0)  fl


 


RDW Coeff of Noah    (11.0-15.0)  %


 


Plt Count    (150-400)  K/uL


 


MPV    (7.40-12.00)  fL


 


Neut % (Auto)    (48.0-80.0)  %


 


Lymph % (Auto)    (16.0-40.0)  %


 


Mono % (Auto)    (0.0-15.0)  %


 


Eos % (Auto)    (0.0-7.0)  %


 


Baso % (Auto)    (0.0-1.5)  %


 


Neut # (Auto)    (1.4-5.7)  K/uL


 


Lymph # (Auto)    (0.6-2.4)  K/uL


 


Mono # (Auto)    (0.0-0.8)  K/uL


 


Eos # (Auto)    (0.0-0.7)  K/uL


 


Baso # (Auto)    (0.0-0.1)  K/uL


 


Nucleated RBC %    /100WBC


 


Nucleated RBCs #    K/uL


 


Sodium  141   (136-148)  mmol/L


 


Potassium  3.9   (3.5-5.1)  mmol/L


 


Chloride  104   ()  mmol/L


 


Carbon Dioxide  28.4   (21.0-32.0)  mmol/L


 


BUN  15   (7.0-18.0)  mg/dL


 


Creatinine  1.2   (0.8-1.3)  mg/dL


 


Est Cr Clr Drug Dosing  46.70   mL/min


 


Estimated GFR (MDRD)  57.1   ml/min


 


Glucose  111 H   ()  mg/dL


 


POC Glucose   97  (70-99)  mg/dL


 


Calcium  9.1   (8.5-10.1)  mg/dL











Result Diagrams: 


                                 12/28/21 06:43





                                 12/28/21 06:43





Sepsis Event Note





- Evaluation


Sepsis Screening Result: No Definite Risk





- Focused Exam


Vital Signs: 


                                   Vital Signs











  Temp Pulse Resp BP Pulse Ox Pulse Ox


 


 12/28/21 06:00       93 L


 


 12/28/21 04:48  98.3 F  85  21 H  104/64  93 L 


 


 12/28/21 00:34  97.6 F  78  20  126/75  93 L 














- Problem List & Annotations


(1) Diabetes mellitus


SNOMED Code(s): 89029992


   Code(s): E11.9 - TYPE 2 DIABETES MELLITUS WITHOUT COMPLICATIONS   Status: 

Acute   Current Visit: Yes   





(2) Ambulatory dysfunction


SNOMED Code(s): 333722338


   Code(s): R26.2 - DIFFICULTY IN WALKING, NOT ELSEWHERE CLASSIFIED   Status: 

Acute   Priority: High   Current Visit: Yes   Onset Date: ~12/25/21   





(3) Ataxia following unspecified cerebrovascular disease


SNOMED Code(s): 37050730977000


   Code(s): I69.993 - ATAXIA FOLLOWING UNSPECIFIED CEREBROVASCULAR DISEASE   

Status: Acute   Priority: High   Current Visit: Yes   Onset Date: ~12/24/21   

Annotation/Comment:: Maintain consistent care for fall risk.  Observe patient.  

MRA studies of both head and neck are planned.  PT/OT/ST consults planned when 

first available. Transferring care by direct report to Dr. Evaristo Keller 20:00, 

12/25/2021.   





- Problem List Review


Problem List Initiated/Reviewed/Updated: Yes





- My Orders


Last 24 Hours: 


My Active Orders





12/29/21 05:11


BASIC METABOLIC PANEL,BMP [CHEM] AM 


CBC WITH AUTO DIFF [HEME] AM 





12/30/21 05:11


BASIC METABOLIC PANEL,BMP [CHEM] AM 


CBC WITH AUTO DIFF [HEME] AM 





12/31/21 05:11


BASIC METABOLIC PANEL,BMP [CHEM] AM 


CBC WITH AUTO DIFF [HEME] AM 














- Assessment


Assessment:: 





1. Ambulatory dysfunction/ataxia secondary to unspecified cerebrovascular 

disease


-Continue with physical therapy in order to build up his strength/ambulation in 

order to perform his activities of daily living


-Continue with atorvastatin home dosage 





2.  Diabetes


-Continue with NovoLog/Accu-Cheks/diabetic diet


-Daily BMP/CBC [Joint Pain] : joint pain [Joint Stiffness] : joint stiffness [Joint Swelling] : joint swelling

## 2023-01-16 ENCOUNTER — HOSPITAL ENCOUNTER (INPATIENT)
Dept: HOSPITAL 56 - MW.ED | Age: 88
LOS: 4 days | Discharge: HOME HEALTH | DRG: 565 | End: 2023-01-20
Attending: INTERNAL MEDICINE | Admitting: INTERNAL MEDICINE
Payer: MEDICARE

## 2023-01-16 DIAGNOSIS — E78.5: ICD-10-CM

## 2023-01-16 DIAGNOSIS — Z79.899: ICD-10-CM

## 2023-01-16 DIAGNOSIS — I10: ICD-10-CM

## 2023-01-16 DIAGNOSIS — I48.91: ICD-10-CM

## 2023-01-16 DIAGNOSIS — H91.90: ICD-10-CM

## 2023-01-16 DIAGNOSIS — R31.9: ICD-10-CM

## 2023-01-16 DIAGNOSIS — N13.6: ICD-10-CM

## 2023-01-16 DIAGNOSIS — Z97.4: ICD-10-CM

## 2023-01-16 DIAGNOSIS — N17.9: ICD-10-CM

## 2023-01-16 DIAGNOSIS — K59.00: ICD-10-CM

## 2023-01-16 DIAGNOSIS — E78.00: ICD-10-CM

## 2023-01-16 DIAGNOSIS — Z79.84: ICD-10-CM

## 2023-01-16 DIAGNOSIS — E11.9: ICD-10-CM

## 2023-01-16 DIAGNOSIS — Z86.19: ICD-10-CM

## 2023-01-16 DIAGNOSIS — N40.0: ICD-10-CM
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
## 2023-01-16 DIAGNOSIS — T79.6XXA: Primary | ICD-10-CM

## 2023-01-16 DIAGNOSIS — M10.9: ICD-10-CM

## 2023-01-16 DIAGNOSIS — Z20.822: ICD-10-CM

## 2023-01-16 DIAGNOSIS — S22.41XA: ICD-10-CM

## 2023-01-16 DIAGNOSIS — E86.0: ICD-10-CM

## 2023-01-16 DIAGNOSIS — J44.9: ICD-10-CM

## 2023-01-16 LAB
BUN SERPL-MCNC: 21 MG/DL (ref 7–18)
CHLORIDE SERPL-SCNC: 106 MMOL/L (ref 98–107)
CO2 SERPL-SCNC: 24.8 MMOL/L (ref 21–32)
EGFRCR SERPLBLD CKD-EPI 2021: 36 ML/MIN (ref 60–?)
FLUAV RNA UPPER RESP QL NAA+PROBE: NEGATIVE
FLUBV RNA UPPER RESP QL NAA+PROBE: NEGATIVE
GLUCOSE SERPL-MCNC: 158 MG/DL (ref 74–106)
LIPASE SERPL-CCNC: 24 U/L (ref 73–393)
POTASSIUM SERPL-SCNC: 3.7 MMOL/L (ref 3.5–5.1)
SARS-COV-2 RNA RESP QL NAA+PROBE: NEGATIVE
SODIUM SERPL-SCNC: 143 MMOL/L (ref 136–148)

## 2023-01-16 PROCEDURE — G0378 HOSPITAL OBSERVATION PER HR: HCPCS

## 2023-01-17 LAB
BUN SERPL-MCNC: 24 MG/DL (ref 7–18)
CHLORIDE SERPL-SCNC: 104 MMOL/L (ref 98–107)
CO2 SERPL-SCNC: 25.7 MMOL/L (ref 21–32)
EGFRCR SERPLBLD CKD-EPI 2021: 38 ML/MIN (ref 60–?)
GLUCOSE SERPL-MCNC: 138 MG/DL (ref 74–106)
POTASSIUM SERPL-SCNC: 3.7 MMOL/L (ref 3.5–5.1)
SODIUM SERPL-SCNC: 140 MMOL/L (ref 136–148)

## 2023-01-17 RX ADMIN — DEXTROSE SCH UNITS: 10 SOLUTION INTRAVENOUS at 20:11

## 2023-01-17 RX ADMIN — DEXTROSE SCH UNITS: 10 SOLUTION INTRAVENOUS at 09:41

## 2023-01-18 LAB
BUN SERPL-MCNC: 24 MG/DL (ref 7–18)
CHLORIDE SERPL-SCNC: 108 MMOL/L (ref 98–107)
CO2 SERPL-SCNC: 24.2 MMOL/L (ref 21–32)
EGFRCR SERPLBLD CKD-EPI 2021: 48 ML/MIN (ref 60–?)
GLUCOSE SERPL-MCNC: 120 MG/DL (ref 74–106)
POTASSIUM SERPL-SCNC: 3.5 MMOL/L (ref 3.5–5.1)
SODIUM SERPL-SCNC: 141 MMOL/L (ref 136–148)

## 2023-01-18 RX ADMIN — DEXTROSE SCH UNITS: 10 SOLUTION INTRAVENOUS at 08:00

## 2023-01-18 RX ADMIN — DEXTROSE SCH UNITS: 10 SOLUTION INTRAVENOUS at 21:04

## 2023-01-19 LAB
BUN SERPL-MCNC: 19 MG/DL (ref 7–18)
CHLORIDE SERPL-SCNC: 109 MMOL/L (ref 98–107)
CO2 SERPL-SCNC: 22.7 MMOL/L (ref 21–32)
EGFRCR SERPLBLD CKD-EPI 2021: 38 ML/MIN (ref 60–?)
GLUCOSE SERPL-MCNC: 133 MG/DL (ref 74–106)
POTASSIUM SERPL-SCNC: 3.9 MMOL/L (ref 3.5–5.1)
SODIUM SERPL-SCNC: 144 MMOL/L (ref 136–148)

## 2023-01-19 RX ADMIN — DEXTROSE SCH UNITS: 10 SOLUTION INTRAVENOUS at 08:54

## 2023-01-19 RX ADMIN — DEXTROSE SCH UNITS: 10 SOLUTION INTRAVENOUS at 21:14

## 2023-01-20 LAB
BUN SERPL-MCNC: 22 MG/DL (ref 7–18)
CHLORIDE SERPL-SCNC: 108 MMOL/L (ref 98–107)
CO2 SERPL-SCNC: 21.4 MMOL/L (ref 21–32)
EGFRCR SERPLBLD CKD-EPI 2021: 38 ML/MIN (ref 60–?)
GLUCOSE SERPL-MCNC: 117 MG/DL (ref 74–106)
POTASSIUM SERPL-SCNC: 4 MMOL/L (ref 3.5–5.1)
SODIUM SERPL-SCNC: 143 MMOL/L (ref 136–148)

## 2023-01-20 RX ADMIN — DEXTROSE SCH UNITS: 10 SOLUTION INTRAVENOUS at 08:24
